# Patient Record
Sex: FEMALE | Race: BLACK OR AFRICAN AMERICAN | NOT HISPANIC OR LATINO | Employment: OTHER | URBAN - METROPOLITAN AREA
[De-identification: names, ages, dates, MRNs, and addresses within clinical notes are randomized per-mention and may not be internally consistent; named-entity substitution may affect disease eponyms.]

---

## 2020-01-01 ENCOUNTER — HOSPITAL ENCOUNTER (EMERGENCY)
Facility: HOSPITAL | Age: 65
Discharge: HOME | End: 2020-06-18
Attending: EMERGENCY MEDICINE | Admitting: EMERGENCY MEDICINE
Payer: MEDICARE

## 2020-01-01 ENCOUNTER — APPOINTMENT (INPATIENT)
Dept: NEUROLOGY | Facility: CLINIC | Age: 65
DRG: 208 | End: 2020-01-01
Payer: MEDICARE

## 2020-01-01 ENCOUNTER — APPOINTMENT (INPATIENT)
Dept: GASTROENTEROLOGY | Facility: HOSPITAL | Age: 65
DRG: 208 | End: 2020-01-01
Payer: MEDICARE

## 2020-01-01 ENCOUNTER — APPOINTMENT (INPATIENT)
Dept: NON INVASIVE DIAGNOSTICS | Facility: HOSPITAL | Age: 65
DRG: 291 | End: 2020-01-01
Payer: MEDICARE

## 2020-01-01 ENCOUNTER — APPOINTMENT (INPATIENT)
Dept: RADIOLOGY | Facility: HOSPITAL | Age: 65
DRG: 208 | End: 2020-01-01
Payer: MEDICARE

## 2020-01-01 ENCOUNTER — APPOINTMENT (EMERGENCY)
Dept: RADIOLOGY | Facility: HOSPITAL | Age: 65
End: 2020-01-01
Payer: MEDICARE

## 2020-01-01 ENCOUNTER — APPOINTMENT (EMERGENCY)
Dept: RADIOLOGY | Facility: HOSPITAL | Age: 65
DRG: 291 | End: 2020-01-01
Payer: MEDICARE

## 2020-01-01 ENCOUNTER — HOSPITAL ENCOUNTER (INPATIENT)
Facility: HOSPITAL | Age: 65
LOS: 1 days | DRG: 291 | End: 2020-06-29
Attending: EMERGENCY MEDICINE | Admitting: ANESTHESIOLOGY
Payer: MEDICARE

## 2020-01-01 ENCOUNTER — HOSPITAL ENCOUNTER (EMERGENCY)
Facility: HOSPITAL | Age: 65
Discharge: HOME | End: 2020-06-17
Attending: EMERGENCY MEDICINE | Admitting: EMERGENCY MEDICINE
Payer: MEDICARE

## 2020-01-01 ENCOUNTER — APPOINTMENT (INPATIENT)
Dept: RADIOLOGY | Facility: HOSPITAL | Age: 65
DRG: 291 | End: 2020-01-01
Payer: MEDICARE

## 2020-01-01 ENCOUNTER — HOSPITAL ENCOUNTER (INPATIENT)
Facility: HOSPITAL | Age: 65
LOS: 4 days | DRG: 208 | End: 2020-07-03
Attending: EMERGENCY MEDICINE | Admitting: EMERGENCY MEDICINE
Payer: MEDICARE

## 2020-01-01 VITALS
DIASTOLIC BLOOD PRESSURE: 55 MMHG | HEART RATE: 92 BPM | SYSTOLIC BLOOD PRESSURE: 99 MMHG | BODY MASS INDEX: 16.84 KG/M2 | HEIGHT: 57 IN | OXYGEN SATURATION: 100 % | WEIGHT: 78.04 LBS | RESPIRATION RATE: 41 BRPM | TEMPERATURE: 97.2 F

## 2020-01-01 VITALS
OXYGEN SATURATION: 95 % | RESPIRATION RATE: 39 BRPM | WEIGHT: 95.24 LBS | DIASTOLIC BLOOD PRESSURE: 52 MMHG | HEART RATE: 78 BPM | SYSTOLIC BLOOD PRESSURE: 114 MMHG | TEMPERATURE: 97.5 F | BODY MASS INDEX: 20.61 KG/M2

## 2020-01-01 VITALS
DIASTOLIC BLOOD PRESSURE: 63 MMHG | TEMPERATURE: 98 F | HEART RATE: 92 BPM | RESPIRATION RATE: 20 BRPM | WEIGHT: 78 LBS | OXYGEN SATURATION: 94 % | SYSTOLIC BLOOD PRESSURE: 105 MMHG

## 2020-01-01 VITALS
HEIGHT: 60 IN | WEIGHT: 78 LBS | BODY MASS INDEX: 15.31 KG/M2 | HEART RATE: 92 BPM | SYSTOLIC BLOOD PRESSURE: 126 MMHG | RESPIRATION RATE: 20 BRPM | OXYGEN SATURATION: 95 % | DIASTOLIC BLOOD PRESSURE: 72 MMHG | TEMPERATURE: 98.4 F

## 2020-01-01 DIAGNOSIS — M79.89 LEFT LEG SWELLING: Primary | ICD-10-CM

## 2020-01-01 DIAGNOSIS — Z99.2 END STAGE RENAL DISEASE ON DIALYSIS (HCC): ICD-10-CM

## 2020-01-01 DIAGNOSIS — R56.9 WITNESSED SEIZURE-LIKE ACTIVITY (HCC): ICD-10-CM

## 2020-01-01 DIAGNOSIS — N18.6 END STAGE RENAL DISEASE ON DIALYSIS (HCC): ICD-10-CM

## 2020-01-01 DIAGNOSIS — N18.6 ESRD (END STAGE RENAL DISEASE) (HCC): Primary | ICD-10-CM

## 2020-01-01 DIAGNOSIS — S31.809A WOUND OF BUTTOCK: ICD-10-CM

## 2020-01-01 DIAGNOSIS — T17.500A MUCUS PLUGGING OF BRONCHI: ICD-10-CM

## 2020-01-01 DIAGNOSIS — I46.9 CARDIOPULMONARY ARREST (HCC): Primary | ICD-10-CM

## 2020-01-01 LAB
ABO GROUP BLD BPU: NORMAL
ABO GROUP BLD: NORMAL
ABO GROUP BLD: NORMAL
ALBUMIN SERPL BCP-MCNC: 2.6 G/DL (ref 3.5–5)
ALBUMIN SERPL BCP-MCNC: 2.8 G/DL (ref 3.5–5)
ALBUMIN SERPL BCP-MCNC: 2.9 G/DL (ref 3.5–5)
ALBUMIN SERPL BCP-MCNC: 2.9 G/DL (ref 3.5–5)
ALBUMIN SERPL BCP-MCNC: 4.2 G/DL (ref 3.5–5)
ALBUMIN SERPL BCP-MCNC: 4.2 G/DL (ref 3.5–5)
ALP SERPL-CCNC: 197 U/L (ref 46–116)
ALP SERPL-CCNC: 198 U/L (ref 46–116)
ALP SERPL-CCNC: 198 U/L (ref 46–116)
ALP SERPL-CCNC: 199 U/L (ref 46–116)
ALP SERPL-CCNC: 294 U/L (ref 46–116)
ALP SERPL-CCNC: 303 U/L (ref 46–116)
ALT SERPL W P-5'-P-CCNC: 19 U/L (ref 12–78)
ALT SERPL W P-5'-P-CCNC: 22 U/L (ref 12–78)
ALT SERPL W P-5'-P-CCNC: 42 U/L (ref 12–78)
ALT SERPL W P-5'-P-CCNC: 52 U/L (ref 12–78)
ALT SERPL W P-5'-P-CCNC: 56 U/L (ref 12–78)
ALT SERPL W P-5'-P-CCNC: 75 U/L (ref 12–78)
ANION GAP SERPL CALCULATED.3IONS-SCNC: 10 MMOL/L (ref 4–13)
ANION GAP SERPL CALCULATED.3IONS-SCNC: 10 MMOL/L (ref 4–13)
ANION GAP SERPL CALCULATED.3IONS-SCNC: 11 MMOL/L (ref 4–13)
ANION GAP SERPL CALCULATED.3IONS-SCNC: 11 MMOL/L (ref 4–13)
ANION GAP SERPL CALCULATED.3IONS-SCNC: 12 MMOL/L (ref 4–13)
ANION GAP SERPL CALCULATED.3IONS-SCNC: 13 MMOL/L (ref 4–13)
ANION GAP SERPL CALCULATED.3IONS-SCNC: 13 MMOL/L (ref 4–13)
ANION GAP SERPL CALCULATED.3IONS-SCNC: 14 MMOL/L (ref 4–13)
ANION GAP SERPL CALCULATED.3IONS-SCNC: 16 MMOL/L (ref 4–13)
ANION GAP SERPL CALCULATED.3IONS-SCNC: 17 MMOL/L (ref 4–13)
ANION GAP SERPL CALCULATED.3IONS-SCNC: 5 MMOL/L (ref 4–13)
ANION GAP SERPL CALCULATED.3IONS-SCNC: 7 MMOL/L (ref 4–13)
ANION GAP SERPL CALCULATED.3IONS-SCNC: 7 MMOL/L (ref 4–13)
ANION GAP SERPL CALCULATED.3IONS-SCNC: 8 MMOL/L (ref 4–13)
ANION GAP SERPL CALCULATED.3IONS-SCNC: 8 MMOL/L (ref 4–13)
ANION GAP SERPL CALCULATED.3IONS-SCNC: 9 MMOL/L (ref 4–13)
ANION GAP SERPL CALCULATED.3IONS-SCNC: 9 MMOL/L (ref 4–13)
APTT PPP: 42 SECONDS (ref 23–37)
APTT PPP: 48 SECONDS (ref 23–37)
APTT PPP: 49 SECONDS (ref 23–37)
APTT PPP: 62 SECONDS (ref 23–37)
APTT PPP: 62 SECONDS (ref 23–37)
APTT PPP: 66 SECONDS (ref 23–37)
APTT PPP: 68 SECONDS (ref 23–37)
ARTERIAL PATENCY WRIST A: YES
AST SERPL W P-5'-P-CCNC: 105 U/L (ref 5–45)
AST SERPL W P-5'-P-CCNC: 114 U/L (ref 5–45)
AST SERPL W P-5'-P-CCNC: 16 U/L (ref 5–45)
AST SERPL W P-5'-P-CCNC: 23 U/L (ref 5–45)
AST SERPL W P-5'-P-CCNC: 57 U/L (ref 5–45)
AST SERPL W P-5'-P-CCNC: 91 U/L (ref 5–45)
ATRIAL RATE: 120 BPM
ATRIAL RATE: 83 BPM
ATRIAL RATE: 90 BPM
BACTERIA SPT RESP CULT: ABNORMAL
BACTERIA SPT RESP CULT: ABNORMAL
BASE EXCESS BLDA CALC-SCNC: -0.1 MMOL/L
BASE EXCESS BLDA CALC-SCNC: -1.3 MMOL/L
BASE EXCESS BLDA CALC-SCNC: -1.8 MMOL/L
BASE EXCESS BLDA CALC-SCNC: -10.9 MMOL/L
BASE EXCESS BLDA CALC-SCNC: -2 MMOL/L (ref -2–3)
BASE EXCESS BLDA CALC-SCNC: -3.4 MMOL/L
BASE EXCESS BLDA CALC-SCNC: 0 MMOL/L (ref -2–3)
BASE EXCESS BLDA CALC-SCNC: 1.5 MMOL/L
BASE EXCESS BLDA CALC-SCNC: 1.9 MMOL/L
BASE EXCESS BLDA CALC-SCNC: 10.3 MMOL/L
BASE EXCESS BLDA CALC-SCNC: 2 MMOL/L
BASE EXCESS BLDA CALC-SCNC: 8 MMOL/L (ref -2–3)
BASOPHILS # BLD AUTO: 0.04 THOUSANDS/ΜL (ref 0–0.1)
BASOPHILS # BLD MANUAL: 0 THOUSAND/UL (ref 0–0.1)
BASOPHILS NFR BLD AUTO: 1 % (ref 0–1)
BASOPHILS NFR MAR MANUAL: 0 % (ref 0–1)
BILIRUB DIRECT SERPL-MCNC: 0.19 MG/DL (ref 0–0.2)
BILIRUB DIRECT SERPL-MCNC: 0.23 MG/DL (ref 0–0.2)
BILIRUB DIRECT SERPL-MCNC: 0.31 MG/DL (ref 0–0.2)
BILIRUB SERPL-MCNC: 0.3 MG/DL (ref 0.2–1)
BILIRUB SERPL-MCNC: 0.3 MG/DL (ref 0.2–1)
BILIRUB SERPL-MCNC: 0.5 MG/DL (ref 0.2–1)
BILIRUB SERPL-MCNC: 0.58 MG/DL (ref 0.2–1)
BILIRUB SERPL-MCNC: 0.81 MG/DL (ref 0.2–1)
BILIRUB SERPL-MCNC: 0.84 MG/DL (ref 0.2–1)
BLD GP AB SCN SERPL QL: NEGATIVE
BLD SMEAR INTERP: NORMAL
BODY TEMPERATURE: 91.4 DEGREES FEHRENHEIT
BODY TEMPERATURE: 92.6 DEGREES FEHRENHEIT
BODY TEMPERATURE: 96.3 DEGREES FEHRENHEIT
BODY TEMPERATURE: 96.7 DEGREES FEHRENHEIT
BODY TEMPERATURE: 97.3 DEGREES FEHRENHEIT
BODY TEMPERATURE: 97.9 DEGREES FEHRENHEIT
BODY TEMPERATURE: 98.7 DEGREES FEHRENHEIT
BODY TEMPERATURE: 98.7 DEGREES FEHRENHEIT
BPU ID: NORMAL
BUN SERPL-MCNC: 22 MG/DL (ref 5–25)
BUN SERPL-MCNC: 27 MG/DL (ref 5–25)
BUN SERPL-MCNC: 32 MG/DL (ref 5–25)
BUN SERPL-MCNC: 33 MG/DL (ref 5–25)
BUN SERPL-MCNC: 34 MG/DL (ref 5–25)
BUN SERPL-MCNC: 36 MG/DL (ref 5–25)
BUN SERPL-MCNC: 38 MG/DL (ref 5–25)
BUN SERPL-MCNC: 44 MG/DL (ref 5–25)
BUN SERPL-MCNC: 46 MG/DL (ref 5–25)
BUN SERPL-MCNC: 55 MG/DL (ref 5–25)
BUN SERPL-MCNC: 66 MG/DL (ref 5–25)
BUN SERPL-MCNC: 84 MG/DL (ref 5–25)
BUN SERPL-MCNC: 90 MG/DL (ref 5–25)
BUN SERPL-MCNC: 91 MG/DL (ref 5–25)
BUN SERPL-MCNC: 97 MG/DL (ref 5–25)
CA-I BLD-SCNC: 1.1 MMOL/L (ref 1.12–1.32)
CA-I BLD-SCNC: 1.11 MMOL/L (ref 1.12–1.32)
CA-I BLD-SCNC: 1.14 MMOL/L (ref 1.12–1.32)
CA-I BLD-SCNC: 1.15 MMOL/L (ref 1.12–1.32)
CA-I BLD-SCNC: 1.16 MMOL/L (ref 1.12–1.32)
CA-I BLD-SCNC: 1.16 MMOL/L (ref 1.12–1.32)
CA-I BLD-SCNC: 1.17 MMOL/L (ref 1.12–1.32)
CA-I BLD-SCNC: 1.18 MMOL/L (ref 1.12–1.32)
CA-I BLD-SCNC: 1.2 MMOL/L (ref 1.12–1.32)
CA-I BLD-SCNC: 1.21 MMOL/L (ref 1.12–1.32)
CA-I BLD-SCNC: 1.23 MMOL/L (ref 1.12–1.32)
CA-I BLD-SCNC: 1.24 MMOL/L (ref 1.12–1.32)
CA-I BLD-SCNC: 1.25 MMOL/L (ref 1.12–1.32)
CA-I BLD-SCNC: 1.39 MMOL/L (ref 1.12–1.32)
CALCIUM SERPL-MCNC: 10.1 MG/DL (ref 8.3–10.1)
CALCIUM SERPL-MCNC: 10.2 MG/DL (ref 8.3–10.1)
CALCIUM SERPL-MCNC: 11.2 MG/DL (ref 8.3–10.1)
CALCIUM SERPL-MCNC: 14.3 MG/DL (ref 8.3–10.1)
CALCIUM SERPL-MCNC: 8.4 MG/DL (ref 8.3–10.1)
CALCIUM SERPL-MCNC: 8.7 MG/DL (ref 8.3–10.1)
CALCIUM SERPL-MCNC: 8.8 MG/DL (ref 8.3–10.1)
CALCIUM SERPL-MCNC: 8.9 MG/DL (ref 8.3–10.1)
CALCIUM SERPL-MCNC: 9 MG/DL (ref 8.3–10.1)
CALCIUM SERPL-MCNC: 9.2 MG/DL (ref 8.3–10.1)
CALCIUM SERPL-MCNC: 9.4 MG/DL (ref 8.3–10.1)
CALCIUM SERPL-MCNC: 9.6 MG/DL (ref 8.3–10.1)
CALCIUM SERPL-MCNC: 9.6 MG/DL (ref 8.3–10.1)
CALCIUM SERPL-MCNC: 9.7 MG/DL (ref 8.3–10.1)
CALCIUM SERPL-MCNC: 9.9 MG/DL (ref 8.3–10.1)
CHLORIDE SERPL-SCNC: 102 MMOL/L (ref 100–108)
CHLORIDE SERPL-SCNC: 102 MMOL/L (ref 100–108)
CHLORIDE SERPL-SCNC: 104 MMOL/L (ref 100–108)
CHLORIDE SERPL-SCNC: 105 MMOL/L (ref 100–108)
CHLORIDE SERPL-SCNC: 105 MMOL/L (ref 100–108)
CHLORIDE SERPL-SCNC: 106 MMOL/L (ref 100–108)
CHLORIDE SERPL-SCNC: 107 MMOL/L (ref 100–108)
CHLORIDE SERPL-SCNC: 107 MMOL/L (ref 100–108)
CHLORIDE SERPL-SCNC: 108 MMOL/L (ref 100–108)
CHLORIDE SERPL-SCNC: 109 MMOL/L (ref 100–108)
CHLORIDE SERPL-SCNC: 112 MMOL/L (ref 100–108)
CO2 SERPL-SCNC: 18 MMOL/L (ref 21–32)
CO2 SERPL-SCNC: 21 MMOL/L (ref 21–32)
CO2 SERPL-SCNC: 23 MMOL/L (ref 21–32)
CO2 SERPL-SCNC: 24 MMOL/L (ref 21–32)
CO2 SERPL-SCNC: 25 MMOL/L (ref 21–32)
CO2 SERPL-SCNC: 26 MMOL/L (ref 21–32)
CO2 SERPL-SCNC: 28 MMOL/L (ref 21–32)
CO2 SERPL-SCNC: 29 MMOL/L (ref 21–32)
CO2 SERPL-SCNC: 30 MMOL/L (ref 21–32)
CO2 SERPL-SCNC: 32 MMOL/L (ref 21–32)
CREAT SERPL-MCNC: 1.98 MG/DL (ref 0.6–1.3)
CREAT SERPL-MCNC: 10.41 MG/DL (ref 0.6–1.3)
CREAT SERPL-MCNC: 10.79 MG/DL (ref 0.6–1.3)
CREAT SERPL-MCNC: 2.27 MG/DL (ref 0.6–1.3)
CREAT SERPL-MCNC: 2.31 MG/DL (ref 0.6–1.3)
CREAT SERPL-MCNC: 2.54 MG/DL (ref 0.6–1.3)
CREAT SERPL-MCNC: 2.88 MG/DL (ref 0.6–1.3)
CREAT SERPL-MCNC: 3.07 MG/DL (ref 0.6–1.3)
CREAT SERPL-MCNC: 3.08 MG/DL (ref 0.6–1.3)
CREAT SERPL-MCNC: 3.55 MG/DL (ref 0.6–1.3)
CREAT SERPL-MCNC: 4.31 MG/DL (ref 0.6–1.3)
CREAT SERPL-MCNC: 5.36 MG/DL (ref 0.6–1.3)
CREAT SERPL-MCNC: 6.53 MG/DL (ref 0.6–1.3)
CREAT SERPL-MCNC: 6.64 MG/DL (ref 0.6–1.3)
CREAT SERPL-MCNC: 7.77 MG/DL (ref 0.6–1.3)
CREAT SERPL-MCNC: 8.75 MG/DL (ref 0.6–1.3)
CREAT SERPL-MCNC: 9.96 MG/DL (ref 0.6–1.3)
CROSSMATCH: NORMAL
EOSINOPHIL # BLD AUTO: 0.33 THOUSAND/ΜL (ref 0–0.61)
EOSINOPHIL # BLD MANUAL: 0.21 THOUSAND/UL (ref 0–0.4)
EOSINOPHIL NFR BLD AUTO: 7 % (ref 0–6)
EOSINOPHIL NFR BLD MANUAL: 3 % (ref 0–6)
ERYTHROCYTE [DISTWIDTH] IN BLOOD BY AUTOMATED COUNT: 16.3 % (ref 11.6–15.1)
ERYTHROCYTE [DISTWIDTH] IN BLOOD BY AUTOMATED COUNT: 16.6 % (ref 11.6–15.1)
ERYTHROCYTE [DISTWIDTH] IN BLOOD BY AUTOMATED COUNT: 16.6 % (ref 11.6–15.1)
ERYTHROCYTE [DISTWIDTH] IN BLOOD BY AUTOMATED COUNT: 17.2 % (ref 11.6–15.1)
ERYTHROCYTE [DISTWIDTH] IN BLOOD BY AUTOMATED COUNT: 17.4 % (ref 11.6–15.1)
ERYTHROCYTE [DISTWIDTH] IN BLOOD BY AUTOMATED COUNT: 17.6 % (ref 11.6–15.1)
ERYTHROCYTE [DISTWIDTH] IN BLOOD BY AUTOMATED COUNT: 17.7 % (ref 11.6–15.1)
ERYTHROCYTE [DISTWIDTH] IN BLOOD BY AUTOMATED COUNT: 17.8 % (ref 11.6–15.1)
EST. AVERAGE GLUCOSE BLD GHB EST-MCNC: 103 MG/DL
FDP BLD QL AGGL: >20 <40
FIBRINOGEN PPP-MCNC: 247 MG/DL (ref 227–495)
FIO2 GAS DIL.REBREATH: 100 L
FIO2 GAS DIL.REBREATH: 40 L
FIO2 GAS DIL.REBREATH: 70 L
GFR SERPL CREATININE-BSD FRML MDRD: 12 ML/MIN/1.73SQ M
GFR SERPL CREATININE-BSD FRML MDRD: 15 ML/MIN/1.73SQ M
GFR SERPL CREATININE-BSD FRML MDRD: 17 ML/MIN/1.73SQ M
GFR SERPL CREATININE-BSD FRML MDRD: 17 ML/MIN/1.73SQ M
GFR SERPL CREATININE-BSD FRML MDRD: 19 ML/MIN/1.73SQ M
GFR SERPL CREATININE-BSD FRML MDRD: 22 ML/MIN/1.73SQ M
GFR SERPL CREATININE-BSD FRML MDRD: 25 ML/MIN/1.73SQ M
GFR SERPL CREATININE-BSD FRML MDRD: 25 ML/MIN/1.73SQ M
GFR SERPL CREATININE-BSD FRML MDRD: 30 ML/MIN/1.73SQ M
GFR SERPL CREATININE-BSD FRML MDRD: 4 ML/MIN/1.73SQ M
GFR SERPL CREATININE-BSD FRML MDRD: 5 ML/MIN/1.73SQ M
GFR SERPL CREATININE-BSD FRML MDRD: 6 ML/MIN/1.73SQ M
GFR SERPL CREATININE-BSD FRML MDRD: 7 ML/MIN/1.73SQ M
GFR SERPL CREATININE-BSD FRML MDRD: 7 ML/MIN/1.73SQ M
GFR SERPL CREATININE-BSD FRML MDRD: 9 ML/MIN/1.73SQ M
GLUCOSE SERPL-MCNC: 100 MG/DL (ref 65–140)
GLUCOSE SERPL-MCNC: 107 MG/DL (ref 65–140)
GLUCOSE SERPL-MCNC: 107 MG/DL (ref 65–140)
GLUCOSE SERPL-MCNC: 114 MG/DL (ref 65–140)
GLUCOSE SERPL-MCNC: 115 MG/DL (ref 65–140)
GLUCOSE SERPL-MCNC: 119 MG/DL (ref 65–140)
GLUCOSE SERPL-MCNC: 120 MG/DL (ref 65–140)
GLUCOSE SERPL-MCNC: 123 MG/DL (ref 65–140)
GLUCOSE SERPL-MCNC: 125 MG/DL (ref 65–140)
GLUCOSE SERPL-MCNC: 126 MG/DL (ref 65–140)
GLUCOSE SERPL-MCNC: 129 MG/DL (ref 65–140)
GLUCOSE SERPL-MCNC: 131 MG/DL (ref 65–140)
GLUCOSE SERPL-MCNC: 138 MG/DL (ref 65–140)
GLUCOSE SERPL-MCNC: 139 MG/DL (ref 65–140)
GLUCOSE SERPL-MCNC: 140 MG/DL (ref 65–140)
GLUCOSE SERPL-MCNC: 145 MG/DL (ref 65–140)
GLUCOSE SERPL-MCNC: 151 MG/DL (ref 65–140)
GLUCOSE SERPL-MCNC: 159 MG/DL (ref 65–140)
GLUCOSE SERPL-MCNC: 162 MG/DL (ref 65–140)
GLUCOSE SERPL-MCNC: 165 MG/DL (ref 65–140)
GLUCOSE SERPL-MCNC: 174 MG/DL (ref 65–140)
GLUCOSE SERPL-MCNC: 198 MG/DL (ref 65–140)
GLUCOSE SERPL-MCNC: 21 MG/DL (ref 65–140)
GLUCOSE SERPL-MCNC: 218 MG/DL (ref 65–140)
GLUCOSE SERPL-MCNC: 220 MG/DL (ref 65–140)
GLUCOSE SERPL-MCNC: 229 MG/DL (ref 65–140)
GLUCOSE SERPL-MCNC: 233 MG/DL (ref 65–140)
GLUCOSE SERPL-MCNC: 244 MG/DL (ref 65–140)
GLUCOSE SERPL-MCNC: 248 MG/DL (ref 65–140)
GLUCOSE SERPL-MCNC: 285 MG/DL (ref 65–140)
GLUCOSE SERPL-MCNC: 287 MG/DL (ref 65–140)
GLUCOSE SERPL-MCNC: 318 MG/DL (ref 65–140)
GLUCOSE SERPL-MCNC: 324 MG/DL (ref 65–140)
GLUCOSE SERPL-MCNC: 360 MG/DL (ref 65–140)
GLUCOSE SERPL-MCNC: 396 MG/DL (ref 65–140)
GLUCOSE SERPL-MCNC: 456 MG/DL (ref 65–140)
GLUCOSE SERPL-MCNC: 76 MG/DL (ref 65–140)
GLUCOSE SERPL-MCNC: 79 MG/DL (ref 65–140)
GLUCOSE SERPL-MCNC: 82 MG/DL (ref 65–140)
GLUCOSE SERPL-MCNC: 85 MG/DL (ref 65–140)
GLUCOSE SERPL-MCNC: 92 MG/DL (ref 65–140)
GLUCOSE SERPL-MCNC: <20 MG/DL (ref 65–140)
GLUCOSE SERPL-MCNC: >600 MG/DL (ref 65–140)
GRAM STN SPEC: ABNORMAL
HAPTOGLOB SERPL-MCNC: <10 MG/DL (ref 37–355)
HBA1C MFR BLD: 5.2 %
HCO3 BLDA-SCNC: 16.2 MMOL/L (ref 22–28)
HCO3 BLDA-SCNC: 20.1 MMOL/L (ref 22–28)
HCO3 BLDA-SCNC: 22.1 MMOL/L (ref 22–28)
HCO3 BLDA-SCNC: 22.2 MMOL/L (ref 22–28)
HCO3 BLDA-SCNC: 22.9 MMOL/L (ref 22–28)
HCO3 BLDA-SCNC: 23.9 MMOL/L (ref 22–28)
HCO3 BLDA-SCNC: 24.5 MMOL/L (ref 22–28)
HCO3 BLDA-SCNC: 24.9 MMOL/L (ref 22–28)
HCO3 BLDA-SCNC: 25.2 MMOL/L (ref 22–28)
HCO3 BLDA-SCNC: 25.7 MMOL/L (ref 22–28)
HCO3 BLDA-SCNC: 33 MMOL/L (ref 22–28)
HCO3 BLDA-SCNC: 33.7 MMOL/L (ref 24–30)
HCT VFR BLD AUTO: 24.3 % (ref 34.8–46.1)
HCT VFR BLD AUTO: 27.2 % (ref 34.8–46.1)
HCT VFR BLD AUTO: 28.3 % (ref 34.8–46.1)
HCT VFR BLD AUTO: 28.4 % (ref 34.8–46.1)
HCT VFR BLD AUTO: 29.1 % (ref 34.8–46.1)
HCT VFR BLD AUTO: 33.2 % (ref 34.8–46.1)
HCT VFR BLD AUTO: 34.8 % (ref 34.8–46.1)
HCT VFR BLD AUTO: 36.1 % (ref 34.8–46.1)
HCT VFR BLD AUTO: 38.8 % (ref 34.8–46.1)
HCT VFR BLD CALC: 28 % (ref 34.8–46.1)
HCT VFR BLD CALC: 32 % (ref 34.8–46.1)
HCT VFR BLD CALC: 35 % (ref 34.8–46.1)
HGB BLD-MCNC: 10.3 G/DL (ref 11.5–15.4)
HGB BLD-MCNC: 10.4 G/DL (ref 11.5–15.4)
HGB BLD-MCNC: 11.6 G/DL (ref 11.5–15.4)
HGB BLD-MCNC: 11.9 G/DL (ref 11.5–15.4)
HGB BLD-MCNC: 7.5 G/DL (ref 11.5–15.4)
HGB BLD-MCNC: 8.4 G/DL (ref 11.5–15.4)
HGB BLD-MCNC: 9 G/DL (ref 11.5–15.4)
HGB BLD-MCNC: 9.1 G/DL (ref 11.5–15.4)
HGB BLD-MCNC: 9.1 G/DL (ref 11.5–15.4)
HGB BLD-MCNC: 9.2 G/DL (ref 11.5–15.4)
HGB BLDA-MCNC: 10.9 G/DL (ref 11.5–15.4)
HGB BLDA-MCNC: 11.9 G/DL (ref 11.5–15.4)
HGB BLDA-MCNC: 9.5 G/DL (ref 11.5–15.4)
HOROWITZ INDEX BLDA+IHG-RTO: 100 MM[HG]
HOROWITZ INDEX BLDA+IHG-RTO: 40 MM[HG]
HOROWITZ INDEX BLDA+IHG-RTO: 60 MM[HG]
HOROWITZ INDEX BLDA+IHG-RTO: 80 MM[HG]
IMM GRANULOCYTES # BLD AUTO: 0.01 THOUSAND/UL (ref 0–0.2)
IMM GRANULOCYTES NFR BLD AUTO: 0 % (ref 0–2)
INR PPP: 1.44 (ref 0.84–1.19)
LACTATE SERPL-SCNC: 1.6 MMOL/L (ref 0.5–2)
LACTATE SERPL-SCNC: 12.4 MMOL/L (ref 0.5–2)
LACTATE SERPL-SCNC: 5.9 MMOL/L (ref 0.5–2)
LDH SERPL-CCNC: 630 U/L (ref 81–234)
LYMPHOCYTES # BLD AUTO: 1.03 THOUSANDS/ΜL (ref 0.6–4.47)
LYMPHOCYTES # BLD AUTO: 3.3 THOUSAND/UL (ref 0.6–4.47)
LYMPHOCYTES # BLD AUTO: 47 % (ref 14–44)
LYMPHOCYTES NFR BLD AUTO: 22 % (ref 14–44)
MAGNESIUM SERPL-MCNC: 2 MG/DL (ref 1.6–2.6)
MAGNESIUM SERPL-MCNC: 2.1 MG/DL (ref 1.6–2.6)
MAGNESIUM SERPL-MCNC: 2.2 MG/DL (ref 1.6–2.6)
MAGNESIUM SERPL-MCNC: 2.3 MG/DL (ref 1.6–2.6)
MAGNESIUM SERPL-MCNC: 2.4 MG/DL (ref 1.6–2.6)
MAGNESIUM SERPL-MCNC: 2.4 MG/DL (ref 1.6–2.6)
MAGNESIUM SERPL-MCNC: 2.5 MG/DL (ref 1.6–2.6)
MAGNESIUM SERPL-MCNC: 2.9 MG/DL (ref 1.6–2.6)
MCH RBC QN AUTO: 27.7 PG (ref 26.8–34.3)
MCH RBC QN AUTO: 27.9 PG (ref 26.8–34.3)
MCH RBC QN AUTO: 28.1 PG (ref 26.8–34.3)
MCH RBC QN AUTO: 28.2 PG (ref 26.8–34.3)
MCH RBC QN AUTO: 28.2 PG (ref 26.8–34.3)
MCH RBC QN AUTO: 28.4 PG (ref 26.8–34.3)
MCH RBC QN AUTO: 28.9 PG (ref 26.8–34.3)
MCH RBC QN AUTO: 29 PG (ref 26.8–34.3)
MCHC RBC AUTO-ENTMCNC: 29.6 G/DL (ref 31.4–37.4)
MCHC RBC AUTO-ENTMCNC: 30.7 G/DL (ref 31.4–37.4)
MCHC RBC AUTO-ENTMCNC: 30.9 G/DL (ref 31.4–37.4)
MCHC RBC AUTO-ENTMCNC: 30.9 G/DL (ref 31.4–37.4)
MCHC RBC AUTO-ENTMCNC: 31.3 G/DL (ref 31.4–37.4)
MCHC RBC AUTO-ENTMCNC: 31.3 G/DL (ref 31.4–37.4)
MCHC RBC AUTO-ENTMCNC: 31.8 G/DL (ref 31.4–37.4)
MCHC RBC AUTO-ENTMCNC: 32.4 G/DL (ref 31.4–37.4)
MCV RBC AUTO: 86 FL (ref 82–98)
MCV RBC AUTO: 89 FL (ref 82–98)
MCV RBC AUTO: 90 FL (ref 82–98)
MCV RBC AUTO: 92 FL (ref 82–98)
MCV RBC AUTO: 94 FL (ref 82–98)
MCV RBC AUTO: 98 FL (ref 82–98)
METAMYELOCYTES NFR BLD MANUAL: 1 % (ref 0–1)
MONOCYTES # BLD AUTO: 0.28 THOUSAND/UL (ref 0–1.22)
MONOCYTES # BLD AUTO: 0.65 THOUSAND/ΜL (ref 0.17–1.22)
MONOCYTES NFR BLD AUTO: 14 % (ref 4–12)
MONOCYTES NFR BLD: 4 % (ref 4–12)
MRSA NOSE QL CULT: NORMAL
NEUTROPHILS # BLD AUTO: 2.53 THOUSANDS/ΜL (ref 1.85–7.62)
NEUTROPHILS # BLD MANUAL: 3.16 THOUSAND/UL (ref 1.85–7.62)
NEUTS SEG NFR BLD AUTO: 45 % (ref 43–75)
NEUTS SEG NFR BLD AUTO: 56 % (ref 43–75)
NRBC BLD AUTO-RTO: 0 /100 WBCS
NRBC BLD AUTO-RTO: 1 /100 WBC (ref 0–2)
NRBC BLD AUTO-RTO: 1 /100 WBCS
NT-PROBNP SERPL-MCNC: ABNORMAL PG/ML
O2 CT BLDA-SCNC: 12.4 ML/DL (ref 16–23)
O2 CT BLDA-SCNC: 12.8 ML/DL (ref 16–23)
O2 CT BLDA-SCNC: 13.3 ML/DL (ref 95–100)
O2 CT BLDA-SCNC: 14.2 ML/DL (ref 16–23)
O2 CT BLDA-SCNC: 14.6 ML/DL (ref 16–23)
O2 CT BLDA-SCNC: 14.9 ML/DL (ref 16–23)
O2 CT BLDA-SCNC: 15.4 ML/DL (ref 16–23)
O2 CT BLDA-SCNC: 15.7 ML/DL (ref 16–23)
O2 CT BLDA-SCNC: 17.3 ML/DL (ref 16–23)
OVALOCYTES BLD QL SMEAR: PRESENT
OXYHGB MFR BLDA: 91.1 % (ref 94–97)
OXYHGB MFR BLDA: 95.3 % (ref 94–97)
OXYHGB MFR BLDA: 96.5 % (ref 94–97)
OXYHGB MFR BLDA: 96.8 % (ref 94–97)
OXYHGB MFR BLDA: 97.6 % (ref 94–97)
OXYHGB MFR BLDA: 97.6 % (ref 94–97)
OXYHGB MFR BLDA: 97.9 % (ref 94–97)
OXYHGB MFR BLDA: 98.6 % (ref 94–97)
OXYHGB MFR BLDA: 99.3 % (ref 94–97)
P AXIS: 54 DEGREES
P AXIS: 68 DEGREES
P AXIS: 71 DEGREES
P AXIS: 73 DEGREES
PCO2 BLD: 23 MMOL/L (ref 21–32)
PCO2 BLD: 27 MMOL/L (ref 21–32)
PCO2 BLD: 35 MMOL/L (ref 21–32)
PCO2 BLD: 35.8 MM HG (ref 36–44)
PCO2 BLD: 46.3 MM HG (ref 36–44)
PCO2 BLD: 53.8 MM HG (ref 42–50)
PCO2 BLD: 69 MM HG
PCO2 BLD: 96 MM HG
PCO2 BLDA: 30.4 MM HG (ref 36–44)
PCO2 BLDA: 30.4 MM HG (ref 36–44)
PCO2 BLDA: 33.4 MM HG (ref 36–44)
PCO2 BLDA: 34 MM HG (ref 36–44)
PCO2 BLDA: 34 MM HG (ref 36–44)
PCO2 BLDA: 36 MM HG (ref 36–44)
PCO2 BLDA: 36.9 MM HG (ref 36–44)
PCO2 BLDA: 39.3 MM HG
PCO2 BLDA: 39.9 MM HG (ref 36–44)
PCO2 BLDA: 40.6 MM HG (ref 36–44)
PCO2 BLDA: 49.8 MM HG
PCO2 TEMP ADJ BLDA: 31.6 MM HG (ref 36–44)
PCO2 TEMP ADJ BLDA: 32.4 MM HG (ref 36–44)
PCO2 TEMP ADJ BLDA: 33.5 MM HG (ref 36–44)
PCO2 TEMP ADJ BLDA: 34.1 MM HG (ref 36–44)
PCO2 TEMP ADJ BLDA: 35.1 MM HG (ref 36–44)
PCO2 TEMP ADJ BLDA: 35.8 MM HG (ref 36–44)
PEEP RESPIRATORY: 5 CM[H2O]
PEEP RESPIRATORY: 5 CM[H2O]
PEEP RESPIRATORY: 8 CM[H2O]
PH BLD: 7.26 [PH] (ref 7.35–7.45)
PH BLD: 7.35 [PH] (ref 7.35–7.45)
PH BLD: 7.4 [PH] (ref 7.35–7.45)
PH BLD: 7.41 [PH]
PH BLD: 7.41 [PH] (ref 7.3–7.4)
PH BLD: 7.43 [PH]
PH BLD: 7.43 [PH] (ref 7.35–7.45)
PH BLD: 7.46 [PH] (ref 7.35–7.45)
PH BLD: 7.5 [PH] (ref 7.35–7.45)
PH BLD: 7.51 [PH] (ref 7.35–7.45)
PH BLD: 7.58 [PH] (ref 7.35–7.45)
PH BLDA: 7.22 [PH] (ref 7.35–7.45)
PH BLDA: 7.41 [PH] (ref 7.35–7.45)
PH BLDA: 7.42 [PH] (ref 7.35–7.45)
PH BLDA: 7.43 [PH] (ref 7.35–7.45)
PH BLDA: 7.44 [PH] (ref 7.35–7.45)
PH BLDA: 7.49 [PH] (ref 7.35–7.45)
PH BLDA: 7.49 [PH] (ref 7.35–7.45)
PH BLDA: 7.51 [PH] (ref 7.35–7.45)
PH BLDA: 7.57 [PH] (ref 7.35–7.45)
PHOSPHATE SERPL-MCNC: 2.8 MG/DL (ref 2.3–4.1)
PHOSPHATE SERPL-MCNC: 2.9 MG/DL (ref 2.3–4.1)
PHOSPHATE SERPL-MCNC: 3.3 MG/DL (ref 2.3–4.1)
PHOSPHATE SERPL-MCNC: 3.4 MG/DL (ref 2.3–4.1)
PHOSPHATE SERPL-MCNC: 3.6 MG/DL (ref 2.3–4.1)
PHOSPHATE SERPL-MCNC: 3.7 MG/DL (ref 2.3–4.1)
PHOSPHATE SERPL-MCNC: 3.7 MG/DL (ref 2.3–4.1)
PHOSPHATE SERPL-MCNC: 3.9 MG/DL (ref 2.3–4.1)
PHOSPHATE SERPL-MCNC: 4.1 MG/DL (ref 2.3–4.1)
PHOSPHATE SERPL-MCNC: 4.3 MG/DL (ref 2.3–4.1)
PHOSPHATE SERPL-MCNC: 4.5 MG/DL (ref 2.3–4.1)
PHOSPHATE SERPL-MCNC: 5 MG/DL (ref 2.3–4.1)
PHOSPHATE SERPL-MCNC: 7.8 MG/DL (ref 2.3–4.1)
PHOSPHATE SERPL-MCNC: 8.9 MG/DL (ref 2.3–4.1)
PLATELET # BLD AUTO: 10 THOUSANDS/UL (ref 149–390)
PLATELET # BLD AUTO: 14 THOUSANDS/UL (ref 149–390)
PLATELET # BLD AUTO: 146 THOUSANDS/UL (ref 149–390)
PLATELET # BLD AUTO: 174 THOUSANDS/UL (ref 149–390)
PLATELET # BLD AUTO: 208 THOUSANDS/UL (ref 149–390)
PLATELET # BLD AUTO: 26 THOUSANDS/UL (ref 149–390)
PLATELET # BLD AUTO: 31 THOUSANDS/UL (ref 149–390)
PLATELET # BLD AUTO: 32 THOUSANDS/UL (ref 149–390)
PLATELET # BLD AUTO: 60 THOUSANDS/UL (ref 149–390)
PLATELET # BLD AUTO: 9 THOUSANDS/UL (ref 149–390)
PLATELET BLD QL SMEAR: ADEQUATE
PMV BLD AUTO: 10.6 FL (ref 8.9–12.7)
PMV BLD AUTO: 11.5 FL (ref 8.9–12.7)
PMV BLD AUTO: 12 FL (ref 8.9–12.7)
PO2 BLD: 101.7 MM HG (ref 75–129)
PO2 BLD: 106 MM HG (ref 35–45)
PO2 BLD: 122.3 MM HG (ref 75–129)
PO2 BLD: 122.5 MM HG (ref 75–129)
PO2 BLD: 170 MM HG (ref 75–129)
PO2 BLD: 182 MM HG (ref 75–129)
PO2 BLD: 348.9 MM HG (ref 75–129)
PO2 BLD: 53 MM HG (ref 75–129)
PO2 BLD: 87 MM HG (ref 75–129)
PO2 BLDA: 101.1 MM HG (ref 75–129)
PO2 BLDA: 101.9 MM HG (ref 75–129)
PO2 BLDA: 129 MM HG (ref 75–129)
PO2 BLDA: 130.3 MM HG (ref 75–129)
PO2 BLDA: 131.2 MM HG (ref 75–129)
PO2 BLDA: 173.8 MM HG (ref 75–129)
PO2 BLDA: 366 MM HG (ref 75–129)
PO2 BLDA: 69.5 MM HG (ref 75–129)
PO2 BLDA: 85.7 MM HG (ref 75–129)
POTASSIUM BLD-SCNC: 4 MMOL/L (ref 3.5–5.3)
POTASSIUM BLD-SCNC: 4.2 MMOL/L (ref 3.5–5.3)
POTASSIUM BLD-SCNC: 4.9 MMOL/L (ref 3.5–5.3)
POTASSIUM SERPL-SCNC: 3.5 MMOL/L (ref 3.5–5.3)
POTASSIUM SERPL-SCNC: 3.5 MMOL/L (ref 3.5–5.3)
POTASSIUM SERPL-SCNC: 3.8 MMOL/L (ref 3.5–5.3)
POTASSIUM SERPL-SCNC: 4 MMOL/L (ref 3.5–5.3)
POTASSIUM SERPL-SCNC: 4.4 MMOL/L (ref 3.5–5.3)
POTASSIUM SERPL-SCNC: 4.5 MMOL/L (ref 3.5–5.3)
POTASSIUM SERPL-SCNC: 4.6 MMOL/L (ref 3.5–5.3)
POTASSIUM SERPL-SCNC: 4.7 MMOL/L (ref 3.5–5.3)
POTASSIUM SERPL-SCNC: 4.8 MMOL/L (ref 3.5–5.3)
POTASSIUM SERPL-SCNC: 4.9 MMOL/L (ref 3.5–5.3)
POTASSIUM SERPL-SCNC: 5 MMOL/L (ref 3.5–5.3)
POTASSIUM SERPL-SCNC: 5 MMOL/L (ref 3.5–5.3)
POTASSIUM SERPL-SCNC: 5.2 MMOL/L (ref 3.5–5.3)
POTASSIUM SERPL-SCNC: 5.3 MMOL/L (ref 3.5–5.3)
POTASSIUM SERPL-SCNC: 6.2 MMOL/L (ref 3.5–5.3)
PR INTERVAL: 117 MS
PR INTERVAL: 121 MS
PR INTERVAL: 121 MS
PR INTERVAL: 124 MS
PROT SERPL-MCNC: 5.5 G/DL (ref 6.4–8.2)
PROT SERPL-MCNC: 5.7 G/DL (ref 6.4–8.2)
PROT SERPL-MCNC: 6 G/DL (ref 6.4–8.2)
PROT SERPL-MCNC: 6.2 G/DL (ref 6.4–8.2)
PROT SERPL-MCNC: 8 G/DL (ref 6.4–8.2)
PROT SERPL-MCNC: 8.2 G/DL (ref 6.4–8.2)
PROTHROMBIN TIME: 17.5 SECONDS (ref 11.6–14.5)
PS VENT FIO2: 50
PS VENT PEEP: 5
QRS AXIS: 28 DEGREES
QRS AXIS: 32 DEGREES
QRS AXIS: 65 DEGREES
QRSD INTERVAL: 100 MS
QRSD INTERVAL: 102 MS
QRSD INTERVAL: 92 MS
QRSD INTERVAL: 92 MS
QRSD INTERVAL: 96 MS
QT INTERVAL: 332 MS
QT INTERVAL: 404 MS
QT INTERVAL: 404 MS
QT INTERVAL: 408 MS
QT INTERVAL: 496 MS
QTC INTERVAL: 494 MS
QTC INTERVAL: 495 MS
QTC INTERVAL: 499 MS
QTC INTERVAL: 500 MS
QTC INTERVAL: 583 MS
RBC # BLD AUTO: 2.64 MILLION/UL (ref 3.81–5.12)
RBC # BLD AUTO: 3.03 MILLION/UL (ref 3.81–5.12)
RBC # BLD AUTO: 3.19 MILLION/UL (ref 3.81–5.12)
RBC # BLD AUTO: 3.24 MILLION/UL (ref 3.81–5.12)
RBC # BLD AUTO: 3.3 MILLION/UL (ref 3.81–5.12)
RBC # BLD AUTO: 3.57 MILLION/UL (ref 3.81–5.12)
RBC # BLD AUTO: 3.69 MILLION/UL (ref 3.81–5.12)
RBC # BLD AUTO: 4.11 MILLION/UL (ref 3.81–5.12)
RBC MORPH BLD: PRESENT
RH BLD: POSITIVE
RH BLD: POSITIVE
SAO2 % BLD FROM PO2: 100 % (ref 60–85)
SAO2 % BLD FROM PO2: 96 % (ref 60–85)
SAO2 % BLD FROM PO2: 98 % (ref 60–85)
SARS-COV-2 RNA RESP QL NAA+PROBE: NEGATIVE
SODIUM BLD-SCNC: 136 MMOL/L (ref 136–145)
SODIUM BLD-SCNC: 141 MMOL/L (ref 136–145)
SODIUM BLD-SCNC: 145 MMOL/L (ref 136–145)
SODIUM SERPL-SCNC: 135 MMOL/L (ref 136–145)
SODIUM SERPL-SCNC: 135 MMOL/L (ref 136–145)
SODIUM SERPL-SCNC: 136 MMOL/L (ref 136–145)
SODIUM SERPL-SCNC: 137 MMOL/L (ref 136–145)
SODIUM SERPL-SCNC: 137 MMOL/L (ref 136–145)
SODIUM SERPL-SCNC: 138 MMOL/L (ref 136–145)
SODIUM SERPL-SCNC: 139 MMOL/L (ref 136–145)
SODIUM SERPL-SCNC: 141 MMOL/L (ref 136–145)
SODIUM SERPL-SCNC: 142 MMOL/L (ref 136–145)
SODIUM SERPL-SCNC: 142 MMOL/L (ref 136–145)
SODIUM SERPL-SCNC: 144 MMOL/L (ref 136–145)
SODIUM SERPL-SCNC: 144 MMOL/L (ref 136–145)
SODIUM SERPL-SCNC: 145 MMOL/L (ref 136–145)
SODIUM SERPL-SCNC: 147 MMOL/L (ref 136–145)
SODIUM SERPL-SCNC: 149 MMOL/L (ref 136–145)
SODIUM SERPL-SCNC: 152 MMOL/L (ref 136–145)
SODIUM SERPL-SCNC: 157 MMOL/L (ref 136–145)
SPECIMEN EXPIRATION DATE: NORMAL
SPECIMEN SOURCE: ABNORMAL
T WAVE AXIS: 215 DEGREES
T WAVE AXIS: 42 DEGREES
T WAVE AXIS: 58 DEGREES
T WAVE AXIS: 66 DEGREES
T WAVE AXIS: 80 DEGREES
TOTAL CELLS COUNTED SPEC: 100
TROPONIN I SERPL-MCNC: 0.03 NG/ML
TROPONIN I SERPL-MCNC: 3.02 NG/ML
TROPONIN I SERPL-MCNC: 5.79 NG/ML
TROPONIN I SERPL-MCNC: 8.63 NG/ML
TROPONIN I SERPL-MCNC: 9.31 NG/ML
UNIT DISPENSE STATUS: NORMAL
UNIT PRODUCT CODE: NORMAL
UNIT RH: NORMAL
VENT - PS: ABNORMAL
VENT AC: 16
VENT AC: 18
VENT AC: 22
VENT- AC: AC
VENTRICULAR RATE: 136 BPM
VENTRICULAR RATE: 83 BPM
VENTRICULAR RATE: 90 BPM
VT SETTING VENT: 300 ML
VT SETTING VENT: 350 ML
WBC # BLD AUTO: 4.59 THOUSAND/UL (ref 4.31–10.16)
WBC # BLD AUTO: 5.41 THOUSAND/UL (ref 4.31–10.16)
WBC # BLD AUTO: 5.48 THOUSAND/UL (ref 4.31–10.16)
WBC # BLD AUTO: 5.53 THOUSAND/UL (ref 4.31–10.16)
WBC # BLD AUTO: 5.9 THOUSAND/UL (ref 4.31–10.16)
WBC # BLD AUTO: 7.03 THOUSAND/UL (ref 4.31–10.16)
WBC # BLD AUTO: 8 THOUSAND/UL (ref 4.31–10.16)
WBC # BLD AUTO: 8.86 THOUSAND/UL (ref 4.31–10.16)

## 2020-01-01 PROCEDURE — 93005 ELECTROCARDIOGRAM TRACING: CPT

## 2020-01-01 PROCEDURE — 80048 BASIC METABOLIC PNL TOTAL CA: CPT | Performed by: NURSE PRACTITIONER

## 2020-01-01 PROCEDURE — 82948 REAGENT STRIP/BLOOD GLUCOSE: CPT

## 2020-01-01 PROCEDURE — 82805 BLOOD GASES W/O2 SATURATION: CPT | Performed by: EMERGENCY MEDICINE

## 2020-01-01 PROCEDURE — 71045 X-RAY EXAM CHEST 1 VIEW: CPT

## 2020-01-01 PROCEDURE — 36415 COLL VENOUS BLD VENIPUNCTURE: CPT | Performed by: EMERGENCY MEDICINE

## 2020-01-01 PROCEDURE — 94760 N-INVAS EAR/PLS OXIMETRY 1: CPT

## 2020-01-01 PROCEDURE — 86901 BLOOD TYPING SEROLOGIC RH(D): CPT | Performed by: NURSE PRACTITIONER

## 2020-01-01 PROCEDURE — 83010 ASSAY OF HAPTOGLOBIN QUANT: CPT | Performed by: NURSE PRACTITIONER

## 2020-01-01 PROCEDURE — 90945 DIALYSIS ONE EVALUATION: CPT

## 2020-01-01 PROCEDURE — 85027 COMPLETE CBC AUTOMATED: CPT | Performed by: NURSE PRACTITIONER

## 2020-01-01 PROCEDURE — 99284 EMERGENCY DEPT VISIT MOD MDM: CPT | Performed by: EMERGENCY MEDICINE

## 2020-01-01 PROCEDURE — 80048 BASIC METABOLIC PNL TOTAL CA: CPT | Performed by: INTERNAL MEDICINE

## 2020-01-01 PROCEDURE — 85014 HEMATOCRIT: CPT | Performed by: ANESTHESIOLOGY

## 2020-01-01 PROCEDURE — 95715 VEEG EA 12-26HR INTMT MNTR: CPT

## 2020-01-01 PROCEDURE — 99291 CRITICAL CARE FIRST HOUR: CPT | Performed by: EMERGENCY MEDICINE

## 2020-01-01 PROCEDURE — 99233 SBSQ HOSP IP/OBS HIGH 50: CPT | Performed by: PHYSICIAN ASSISTANT

## 2020-01-01 PROCEDURE — NC001 PR NO CHARGE: Performed by: PSYCHIATRY & NEUROLOGY

## 2020-01-01 PROCEDURE — 83615 LACTATE (LD) (LDH) ENZYME: CPT | Performed by: NURSE PRACTITIONER

## 2020-01-01 PROCEDURE — 36556 INSERT NON-TUNNEL CV CATH: CPT | Performed by: ANESTHESIOLOGY

## 2020-01-01 PROCEDURE — 99233 SBSQ HOSP IP/OBS HIGH 50: CPT | Performed by: INTERNAL MEDICINE

## 2020-01-01 PROCEDURE — 84100 ASSAY OF PHOSPHORUS: CPT | Performed by: PHYSICIAN ASSISTANT

## 2020-01-01 PROCEDURE — 92950 HEART/LUNG RESUSCITATION CPR: CPT

## 2020-01-01 PROCEDURE — NC001 PR NO CHARGE: Performed by: EMERGENCY MEDICINE

## 2020-01-01 PROCEDURE — 90945 DIALYSIS ONE EVALUATION: CPT | Performed by: INTERNAL MEDICINE

## 2020-01-01 PROCEDURE — 80048 BASIC METABOLIC PNL TOTAL CA: CPT | Performed by: PHYSICIAN ASSISTANT

## 2020-01-01 PROCEDURE — 31500 INSERT EMERGENCY AIRWAY: CPT | Performed by: EMERGENCY MEDICINE

## 2020-01-01 PROCEDURE — 85027 COMPLETE CBC AUTOMATED: CPT | Performed by: EMERGENCY MEDICINE

## 2020-01-01 PROCEDURE — 85730 THROMBOPLASTIN TIME PARTIAL: CPT | Performed by: EMERGENCY MEDICINE

## 2020-01-01 PROCEDURE — 83735 ASSAY OF MAGNESIUM: CPT | Performed by: PHYSICIAN ASSISTANT

## 2020-01-01 PROCEDURE — 82330 ASSAY OF CALCIUM: CPT | Performed by: INTERNAL MEDICINE

## 2020-01-01 PROCEDURE — 74176 CT ABD & PELVIS W/O CONTRAST: CPT

## 2020-01-01 PROCEDURE — 94002 VENT MGMT INPAT INIT DAY: CPT

## 2020-01-01 PROCEDURE — 96374 THER/PROPH/DIAG INJ IV PUSH: CPT

## 2020-01-01 PROCEDURE — 87081 CULTURE SCREEN ONLY: CPT | Performed by: ANESTHESIOLOGY

## 2020-01-01 PROCEDURE — 84295 ASSAY OF SERUM SODIUM: CPT

## 2020-01-01 PROCEDURE — 83605 ASSAY OF LACTIC ACID: CPT | Performed by: EMERGENCY MEDICINE

## 2020-01-01 PROCEDURE — 84132 ASSAY OF SERUM POTASSIUM: CPT

## 2020-01-01 PROCEDURE — 82330 ASSAY OF CALCIUM: CPT | Performed by: PHYSICIAN ASSISTANT

## 2020-01-01 PROCEDURE — 85025 COMPLETE CBC W/AUTO DIFF WBC: CPT | Performed by: EMERGENCY MEDICINE

## 2020-01-01 PROCEDURE — 87077 CULTURE AEROBIC IDENTIFY: CPT | Performed by: NURSE PRACTITIONER

## 2020-01-01 PROCEDURE — 99222 1ST HOSP IP/OBS MODERATE 55: CPT | Performed by: SURGERY

## 2020-01-01 PROCEDURE — 87070 CULTURE OTHR SPECIMN AEROBIC: CPT | Performed by: NURSE PRACTITIONER

## 2020-01-01 PROCEDURE — 99291 CRITICAL CARE FIRST HOUR: CPT

## 2020-01-01 PROCEDURE — 84100 ASSAY OF PHOSPHORUS: CPT | Performed by: INTERNAL MEDICINE

## 2020-01-01 PROCEDURE — 94003 VENT MGMT INPAT SUBQ DAY: CPT

## 2020-01-01 PROCEDURE — 99291 CRITICAL CARE FIRST HOUR: CPT | Performed by: ANESTHESIOLOGY

## 2020-01-01 PROCEDURE — 82330 ASSAY OF CALCIUM: CPT

## 2020-01-01 PROCEDURE — 0BH17EZ INSERTION OF ENDOTRACHEAL AIRWAY INTO TRACHEA, VIA NATURAL OR ARTIFICIAL OPENING: ICD-10-PCS | Performed by: ANESTHESIOLOGY

## 2020-01-01 PROCEDURE — 5A1945Z RESPIRATORY VENTILATION, 24-96 CONSECUTIVE HOURS: ICD-10-PCS | Performed by: EMERGENCY MEDICINE

## 2020-01-01 PROCEDURE — P9016 RBC LEUKOCYTES REDUCED: HCPCS

## 2020-01-01 PROCEDURE — 85007 BL SMEAR W/DIFF WBC COUNT: CPT | Performed by: EMERGENCY MEDICINE

## 2020-01-01 PROCEDURE — 83735 ASSAY OF MAGNESIUM: CPT | Performed by: EMERGENCY MEDICINE

## 2020-01-01 PROCEDURE — 96365 THER/PROPH/DIAG IV INF INIT: CPT

## 2020-01-01 PROCEDURE — 5A1D90Z PERFORMANCE OF URINARY FILTRATION, CONTINUOUS, GREATER THAN 18 HOURS PER DAY: ICD-10-PCS | Performed by: INTERNAL MEDICINE

## 2020-01-01 PROCEDURE — 80076 HEPATIC FUNCTION PANEL: CPT | Performed by: PHYSICIAN ASSISTANT

## 2020-01-01 PROCEDURE — 95700 EEG CONT REC W/VID EEG TECH: CPT

## 2020-01-01 PROCEDURE — 84484 ASSAY OF TROPONIN QUANT: CPT | Performed by: EMERGENCY MEDICINE

## 2020-01-01 PROCEDURE — 31500 INSERT EMERGENCY AIRWAY: CPT

## 2020-01-01 PROCEDURE — 82803 BLOOD GASES ANY COMBINATION: CPT

## 2020-01-01 PROCEDURE — 99284 EMERGENCY DEPT VISIT MOD MDM: CPT

## 2020-01-01 PROCEDURE — 86022 PLATELET ANTIBODIES: CPT | Performed by: NURSE PRACTITIONER

## 2020-01-01 PROCEDURE — 82805 BLOOD GASES W/O2 SATURATION: CPT | Performed by: NURSE PRACTITIONER

## 2020-01-01 PROCEDURE — 84100 ASSAY OF PHOSPHORUS: CPT | Performed by: EMERGENCY MEDICINE

## 2020-01-01 PROCEDURE — 83036 HEMOGLOBIN GLYCOSYLATED A1C: CPT | Performed by: PHYSICIAN ASSISTANT

## 2020-01-01 PROCEDURE — NC001 PR NO CHARGE: Performed by: ANESTHESIOLOGY

## 2020-01-01 PROCEDURE — 85362 FIBRIN DEGRADATION PRODUCTS: CPT | Performed by: NURSE PRACTITIONER

## 2020-01-01 PROCEDURE — 84484 ASSAY OF TROPONIN QUANT: CPT | Performed by: PHYSICIAN ASSISTANT

## 2020-01-01 PROCEDURE — 85027 COMPLETE CBC AUTOMATED: CPT | Performed by: PHYSICIAN ASSISTANT

## 2020-01-01 PROCEDURE — 83605 ASSAY OF LACTIC ACID: CPT | Performed by: PHYSICIAN ASSISTANT

## 2020-01-01 PROCEDURE — 82805 BLOOD GASES W/O2 SATURATION: CPT | Performed by: PHYSICIAN ASSISTANT

## 2020-01-01 PROCEDURE — 36556 INSERT NON-TUNNEL CV CATH: CPT | Performed by: PHYSICIAN ASSISTANT

## 2020-01-01 PROCEDURE — 82947 ASSAY GLUCOSE BLOOD QUANT: CPT | Performed by: ANESTHESIOLOGY

## 2020-01-01 PROCEDURE — 99283 EMERGENCY DEPT VISIT LOW MDM: CPT

## 2020-01-01 PROCEDURE — 83880 ASSAY OF NATRIURETIC PEPTIDE: CPT | Performed by: EMERGENCY MEDICINE

## 2020-01-01 PROCEDURE — 93010 ELECTROCARDIOGRAM REPORT: CPT | Performed by: INTERNAL MEDICINE

## 2020-01-01 PROCEDURE — 99223 1ST HOSP IP/OBS HIGH 75: CPT | Performed by: INTERNAL MEDICINE

## 2020-01-01 PROCEDURE — 5A1935Z RESPIRATORY VENTILATION, LESS THAN 24 CONSECUTIVE HOURS: ICD-10-PCS | Performed by: EMERGENCY MEDICINE

## 2020-01-01 PROCEDURE — NC001 PR NO CHARGE: Performed by: INTERNAL MEDICINE

## 2020-01-01 PROCEDURE — 70450 CT HEAD/BRAIN W/O DYE: CPT

## 2020-01-01 PROCEDURE — 85049 AUTOMATED PLATELET COUNT: CPT | Performed by: PHYSICIAN ASSISTANT

## 2020-01-01 PROCEDURE — 82542 COL CHROMOTOGRAPHY QUAL/QUAN: CPT | Performed by: NURSE PRACTITIONER

## 2020-01-01 PROCEDURE — 85730 THROMBOPLASTIN TIME PARTIAL: CPT | Performed by: NURSE PRACTITIONER

## 2020-01-01 PROCEDURE — 82947 ASSAY GLUCOSE BLOOD QUANT: CPT

## 2020-01-01 PROCEDURE — 85384 FIBRINOGEN ACTIVITY: CPT | Performed by: NURSE PRACTITIONER

## 2020-01-01 PROCEDURE — 87186 SC STD MICRODIL/AGAR DIL: CPT | Performed by: NURSE PRACTITIONER

## 2020-01-01 PROCEDURE — 5A12012 PERFORMANCE OF CARDIAC OUTPUT, SINGLE, MANUAL: ICD-10-PCS | Performed by: ANESTHESIOLOGY

## 2020-01-01 PROCEDURE — 99292 CRITICAL CARE ADDL 30 MIN: CPT | Performed by: ANESTHESIOLOGY

## 2020-01-01 PROCEDURE — NC001 PR NO CHARGE: Performed by: PHYSICIAN ASSISTANT

## 2020-01-01 PROCEDURE — 36620 INSERTION CATHETER ARTERY: CPT | Performed by: ANESTHESIOLOGY

## 2020-01-01 PROCEDURE — 31622 DX BRONCHOSCOPE/WASH: CPT | Performed by: EMERGENCY MEDICINE

## 2020-01-01 PROCEDURE — 71250 CT THORAX DX C-: CPT

## 2020-01-01 PROCEDURE — 36600 WITHDRAWAL OF ARTERIAL BLOOD: CPT

## 2020-01-01 PROCEDURE — 02HV33Z INSERTION OF INFUSION DEVICE INTO SUPERIOR VENA CAVA, PERCUTANEOUS APPROACH: ICD-10-PCS | Performed by: EMERGENCY MEDICINE

## 2020-01-01 PROCEDURE — 0BC78ZZ EXTIRPATION OF MATTER FROM LEFT MAIN BRONCHUS, VIA NATURAL OR ARTIFICIAL OPENING ENDOSCOPIC: ICD-10-PCS | Performed by: EMERGENCY MEDICINE

## 2020-01-01 PROCEDURE — 85018 HEMOGLOBIN: CPT | Performed by: ANESTHESIOLOGY

## 2020-01-01 PROCEDURE — 83735 ASSAY OF MAGNESIUM: CPT | Performed by: INTERNAL MEDICINE

## 2020-01-01 PROCEDURE — 85018 HEMOGLOBIN: CPT | Performed by: NURSE PRACTITIONER

## 2020-01-01 PROCEDURE — 99283 EMERGENCY DEPT VISIT LOW MDM: CPT | Performed by: EMERGENCY MEDICINE

## 2020-01-01 PROCEDURE — 95720 EEG PHY/QHP EA INCR W/VEEG: CPT | Performed by: PSYCHIATRY & NEUROLOGY

## 2020-01-01 PROCEDURE — 80053 COMPREHEN METABOLIC PANEL: CPT | Performed by: EMERGENCY MEDICINE

## 2020-01-01 PROCEDURE — 30233N1 TRANSFUSION OF NONAUTOLOGOUS RED BLOOD CELLS INTO PERIPHERAL VEIN, PERCUTANEOUS APPROACH: ICD-10-PCS | Performed by: EMERGENCY MEDICINE

## 2020-01-01 PROCEDURE — 86850 RBC ANTIBODY SCREEN: CPT | Performed by: NURSE PRACTITIONER

## 2020-01-01 PROCEDURE — 95722 EEG PHY/QHP>36<60 HR W/VEEG: CPT | Performed by: PSYCHIATRY & NEUROLOGY

## 2020-01-01 PROCEDURE — 72125 CT NECK SPINE W/O DYE: CPT

## 2020-01-01 PROCEDURE — 85014 HEMATOCRIT: CPT

## 2020-01-01 PROCEDURE — 86900 BLOOD TYPING SEROLOGIC ABO: CPT | Performed by: NURSE PRACTITIONER

## 2020-01-01 PROCEDURE — 86920 COMPATIBILITY TEST SPIN: CPT

## 2020-01-01 PROCEDURE — 87635 SARS-COV-2 COVID-19 AMP PRB: CPT | Performed by: EMERGENCY MEDICINE

## 2020-01-01 PROCEDURE — 93306 TTE W/DOPPLER COMPLETE: CPT

## 2020-01-01 PROCEDURE — 80076 HEPATIC FUNCTION PANEL: CPT | Performed by: NURSE PRACTITIONER

## 2020-01-01 PROCEDURE — 87205 SMEAR GRAM STAIN: CPT | Performed by: NURSE PRACTITIONER

## 2020-01-01 PROCEDURE — 93306 TTE W/DOPPLER COMPLETE: CPT | Performed by: INTERNAL MEDICINE

## 2020-01-01 PROCEDURE — 85610 PROTHROMBIN TIME: CPT | Performed by: NURSE PRACTITIONER

## 2020-01-01 PROCEDURE — 94150 VITAL CAPACITY TEST: CPT

## 2020-01-01 RX ORDER — PROPOFOL 10 MG/ML
INJECTION, EMULSION INTRAVENOUS
Status: COMPLETED
Start: 2020-01-01 | End: 2020-01-01

## 2020-01-01 RX ORDER — CHLORHEXIDINE GLUCONATE 0.12 MG/ML
15 RINSE ORAL EVERY 12 HOURS SCHEDULED
Status: DISCONTINUED | OUTPATIENT
Start: 2020-01-01 | End: 2020-01-01 | Stop reason: SDUPTHER

## 2020-01-01 RX ORDER — OLANZAPINE 5 MG/1
5 TABLET, ORALLY DISINTEGRATING ORAL ONCE
Status: DISCONTINUED | OUTPATIENT
Start: 2020-01-01 | End: 2020-01-01

## 2020-01-01 RX ORDER — ACETAMINOPHEN 160 MG/5ML
975 SUSPENSION, ORAL (FINAL DOSE FORM) ORAL EVERY 6 HOURS
Status: DISCONTINUED | OUTPATIENT
Start: 2020-01-01 | End: 2020-01-01 | Stop reason: HOSPADM

## 2020-01-01 RX ORDER — LORAZEPAM 2 MG/ML
2 INJECTION INTRAMUSCULAR EVERY 4 HOURS PRN
Status: DISCONTINUED | OUTPATIENT
Start: 2020-01-01 | End: 2020-01-01 | Stop reason: HOSPADM

## 2020-01-01 RX ORDER — CALCIUM GLUCONATE 20 MG/ML
1 INJECTION, SOLUTION INTRAVENOUS ONCE
Status: COMPLETED | OUTPATIENT
Start: 2020-01-01 | End: 2020-01-01

## 2020-01-01 RX ORDER — SODIUM BICARBONATE 84 MG/ML
INJECTION, SOLUTION INTRAVENOUS CODE/TRAUMA/SEDATION MEDICATION
Status: COMPLETED | OUTPATIENT
Start: 2020-01-01 | End: 2020-01-01

## 2020-01-01 RX ORDER — DEXTROSE MONOHYDRATE 25 G/50ML
INJECTION, SOLUTION INTRAVENOUS
Status: DISCONTINUED
Start: 2020-01-01 | End: 2020-01-01 | Stop reason: WASHOUT

## 2020-01-01 RX ORDER — FENTANYL CITRATE 50 UG/ML
INJECTION, SOLUTION INTRAMUSCULAR; INTRAVENOUS
Status: COMPLETED
Start: 2020-01-01 | End: 2020-01-01

## 2020-01-01 RX ORDER — POTASSIUM CHLORIDE 29.8 MG/ML
40 INJECTION INTRAVENOUS ONCE
Status: COMPLETED | OUTPATIENT
Start: 2020-01-01 | End: 2020-01-01

## 2020-01-01 RX ORDER — DEXTROSE MONOHYDRATE 25 G/50ML
INJECTION, SOLUTION INTRAVENOUS CODE/TRAUMA/SEDATION MEDICATION
Status: COMPLETED | OUTPATIENT
Start: 2020-01-01 | End: 2020-01-01

## 2020-01-01 RX ORDER — MIDAZOLAM HYDROCHLORIDE 2 MG/2ML
2 INJECTION, SOLUTION INTRAMUSCULAR; INTRAVENOUS ONCE
Status: COMPLETED | OUTPATIENT
Start: 2020-01-01 | End: 2020-01-01

## 2020-01-01 RX ORDER — DEXTROSE MONOHYDRATE 25 G/50ML
25 INJECTION, SOLUTION INTRAVENOUS ONCE
Status: COMPLETED | OUTPATIENT
Start: 2020-01-01 | End: 2020-01-01

## 2020-01-01 RX ORDER — PROPOFOL 10 MG/ML
5-50 INJECTION, EMULSION INTRAVENOUS
Status: DISCONTINUED | OUTPATIENT
Start: 2020-01-01 | End: 2020-01-01

## 2020-01-01 RX ORDER — ATORVASTATIN CALCIUM 40 MG/1
40 TABLET, FILM COATED ORAL
Status: DISCONTINUED | OUTPATIENT
Start: 2020-01-01 | End: 2020-01-01 | Stop reason: HOSPADM

## 2020-01-01 RX ORDER — CALCIUM CHLORIDE 100 MG/ML
SYRINGE (ML) INTRAVENOUS CODE/TRAUMA/SEDATION MEDICATION
Status: COMPLETED | OUTPATIENT
Start: 2020-01-01 | End: 2020-01-01

## 2020-01-01 RX ORDER — MIDAZOLAM HYDROCHLORIDE 2 MG/2ML
2 INJECTION, SOLUTION INTRAMUSCULAR; INTRAVENOUS ONCE
Status: DISCONTINUED | OUTPATIENT
Start: 2020-01-01 | End: 2020-01-01

## 2020-01-01 RX ORDER — LIDOCAINE HYDROCHLORIDE 10 MG/ML
10 INJECTION, SOLUTION EPIDURAL; INFILTRATION; INTRACAUDAL; PERINEURAL ONCE
Status: COMPLETED | OUTPATIENT
Start: 2020-01-01 | End: 2020-01-01

## 2020-01-01 RX ORDER — HEPARIN SODIUM 5000 [USP'U]/ML
5000 INJECTION, SOLUTION INTRAVENOUS; SUBCUTANEOUS EVERY 8 HOURS SCHEDULED
Status: CANCELLED | OUTPATIENT
Start: 2020-01-01 | Stop reason: SDUPTHER

## 2020-01-01 RX ORDER — ACETAMINOPHEN 325 MG/1
975 TABLET ORAL EVERY 6 HOURS
Status: DISCONTINUED | OUTPATIENT
Start: 2020-01-01 | End: 2020-01-01

## 2020-01-01 RX ORDER — FENTANYL CITRATE 50 UG/ML
100 INJECTION, SOLUTION INTRAMUSCULAR; INTRAVENOUS ONCE
Status: COMPLETED | OUTPATIENT
Start: 2020-01-01 | End: 2020-01-01

## 2020-01-01 RX ORDER — EPINEPHRINE 0.1 MG/ML
SYRINGE (ML) INJECTION CODE/TRAUMA/SEDATION MEDICATION
Status: COMPLETED | OUTPATIENT
Start: 2020-01-01 | End: 2020-01-01

## 2020-01-01 RX ORDER — CHLORHEXIDINE GLUCONATE 0.12 MG/ML
15 RINSE ORAL EVERY 12 HOURS SCHEDULED
Status: CANCELLED | OUTPATIENT
Start: 2020-01-01

## 2020-01-01 RX ORDER — MIDODRINE HYDROCHLORIDE 5 MG/1
10 TABLET ORAL 3 TIMES DAILY
Status: DISCONTINUED | OUTPATIENT
Start: 2020-01-01 | End: 2020-01-01

## 2020-01-01 RX ORDER — FENTANYL CITRATE 50 UG/ML
50 INJECTION, SOLUTION INTRAMUSCULAR; INTRAVENOUS EVERY 2 HOUR PRN
Status: DISCONTINUED | OUTPATIENT
Start: 2020-01-01 | End: 2020-01-01 | Stop reason: HOSPADM

## 2020-01-01 RX ORDER — HEPARIN SODIUM 10000 [USP'U]/100ML
500 INJECTION, SOLUTION INTRAVENOUS CONTINUOUS
Status: DISCONTINUED | OUTPATIENT
Start: 2020-01-01 | End: 2020-01-01 | Stop reason: HOSPADM

## 2020-01-01 RX ORDER — FENTANYL CITRATE 50 UG/ML
25 INJECTION, SOLUTION INTRAMUSCULAR; INTRAVENOUS ONCE
Status: DISCONTINUED | OUTPATIENT
Start: 2020-01-01 | End: 2020-01-01 | Stop reason: HOSPADM

## 2020-01-01 RX ORDER — FENTANYL CITRATE/PF 50 MCG/ML
50 SYRINGE (ML) INJECTION EVERY 2 HOUR PRN
Status: DISCONTINUED | OUTPATIENT
Start: 2020-01-01 | End: 2020-01-01

## 2020-01-01 RX ORDER — HEPARIN SODIUM 5000 [USP'U]/ML
5000 INJECTION, SOLUTION INTRAVENOUS; SUBCUTANEOUS EVERY 8 HOURS SCHEDULED
Status: DISCONTINUED | OUTPATIENT
Start: 2020-01-01 | End: 2020-01-01 | Stop reason: SDUPTHER

## 2020-01-01 RX ORDER — ATORVASTATIN CALCIUM 40 MG/1
40 TABLET, FILM COATED ORAL
Status: CANCELLED | OUTPATIENT
Start: 2020-01-01

## 2020-01-01 RX ORDER — DEXTROSE MONOHYDRATE 25 G/50ML
INJECTION, SOLUTION INTRAVENOUS
Status: COMPLETED
Start: 2020-01-01 | End: 2020-01-01

## 2020-01-01 RX ORDER — LORAZEPAM 2 MG/ML
2 INJECTION INTRAMUSCULAR EVERY 4 HOURS PRN
Status: CANCELLED | OUTPATIENT
Start: 2020-01-01

## 2020-01-01 RX ORDER — MIDAZOLAM HYDROCHLORIDE 2 MG/2ML
INJECTION, SOLUTION INTRAMUSCULAR; INTRAVENOUS
Status: COMPLETED
Start: 2020-01-01 | End: 2020-01-01

## 2020-01-01 RX ORDER — MIDODRINE HYDROCHLORIDE 5 MG/1
5 TABLET ORAL ONCE
Status: COMPLETED | OUTPATIENT
Start: 2020-01-01 | End: 2020-01-01

## 2020-01-01 RX ORDER — MIDAZOLAM HYDROCHLORIDE 2 MG/2ML
4 INJECTION, SOLUTION INTRAMUSCULAR; INTRAVENOUS ONCE
Status: COMPLETED | OUTPATIENT
Start: 2020-01-01 | End: 2020-01-01

## 2020-01-01 RX ORDER — ATORVASTATIN CALCIUM 40 MG/1
40 TABLET, FILM COATED ORAL DAILY
COMMUNITY
End: 2020-01-01 | Stop reason: HOSPADM

## 2020-01-01 RX ORDER — FENTANYL CITRATE 50 UG/ML
50 INJECTION, SOLUTION INTRAMUSCULAR; INTRAVENOUS ONCE
Status: COMPLETED | OUTPATIENT
Start: 2020-01-01 | End: 2020-01-01

## 2020-01-01 RX ORDER — HEPARIN SODIUM 5000 [USP'U]/ML
5000 INJECTION, SOLUTION INTRAVENOUS; SUBCUTANEOUS EVERY 8 HOURS SCHEDULED
Status: DISCONTINUED | OUTPATIENT
Start: 2020-01-01 | End: 2020-01-01

## 2020-01-01 RX ORDER — PROPOFOL 10 MG/ML
5-50 INJECTION, EMULSION INTRAVENOUS
Status: DISCONTINUED | OUTPATIENT
Start: 2020-01-01 | End: 2020-01-01 | Stop reason: HOSPADM

## 2020-01-01 RX ORDER — CHLORHEXIDINE GLUCONATE 0.12 MG/ML
15 RINSE ORAL EVERY 12 HOURS SCHEDULED
Status: DISCONTINUED | OUTPATIENT
Start: 2020-01-01 | End: 2020-01-01 | Stop reason: HOSPADM

## 2020-01-01 RX ORDER — CHLORHEXIDINE GLUCONATE 0.12 MG/ML
15 RINSE ORAL EVERY 12 HOURS SCHEDULED
Status: DISCONTINUED | OUTPATIENT
Start: 2020-01-01 | End: 2020-01-01

## 2020-01-01 RX ORDER — ARGATROBAN 1 MG/ML
.05-3 INJECTION INTRAVENOUS
Status: DISCONTINUED | OUTPATIENT
Start: 2020-01-01 | End: 2020-01-01 | Stop reason: HOSPADM

## 2020-01-01 RX ORDER — LORAZEPAM 2 MG/ML
2 INJECTION INTRAMUSCULAR EVERY 4 HOURS PRN
Status: DISCONTINUED | OUTPATIENT
Start: 2020-01-01 | End: 2020-01-01

## 2020-01-01 RX ORDER — MIDODRINE HYDROCHLORIDE 10 MG/1
10 TABLET ORAL 3 TIMES DAILY
COMMUNITY
End: 2020-01-01 | Stop reason: HOSPADM

## 2020-01-01 RX ORDER — MIDODRINE HYDROCHLORIDE 5 MG/1
15 TABLET ORAL 3 TIMES DAILY
Status: DISCONTINUED | OUTPATIENT
Start: 2020-01-01 | End: 2020-01-01 | Stop reason: HOSPADM

## 2020-01-01 RX ORDER — HYDROMORPHONE HCL/PF 1 MG/ML
1 SYRINGE (ML) INJECTION
Status: DISCONTINUED | OUTPATIENT
Start: 2020-01-01 | End: 2020-01-01 | Stop reason: HOSPADM

## 2020-01-01 RX ORDER — FENTANYL CITRATE-0.9 % NACL/PF 10 MCG/ML
25 PLASTIC BAG, INJECTION (ML) INTRAVENOUS CONTINUOUS
Status: DISCONTINUED | OUTPATIENT
Start: 2020-01-01 | End: 2020-01-01

## 2020-01-01 RX ORDER — MAGNESIUM SULFATE 1 G/100ML
1 INJECTION INTRAVENOUS ONCE
Status: COMPLETED | OUTPATIENT
Start: 2020-01-01 | End: 2020-01-01

## 2020-01-01 RX ADMIN — DEXTROSE MONOHYDRATE 25 G: 25 INJECTION, SOLUTION INTRAVENOUS at 00:36

## 2020-01-01 RX ADMIN — CHLORHEXIDINE GLUCONATE 0.12% ORAL RINSE 15 ML: 1.2 LIQUID ORAL at 08:01

## 2020-01-01 RX ADMIN — LORAZEPAM 2 MG: 2 INJECTION INTRAMUSCULAR; INTRAVENOUS at 06:09

## 2020-01-01 RX ADMIN — CHLORHEXIDINE GLUCONATE 0.12% ORAL RINSE 15 ML: 1.2 LIQUID ORAL at 08:34

## 2020-01-01 RX ADMIN — FENTANYL CITRATE 50 MCG: 50 INJECTION, SOLUTION INTRAMUSCULAR; INTRAVENOUS at 21:20

## 2020-01-01 RX ADMIN — ATORVASTATIN CALCIUM 40 MG: 40 TABLET, FILM COATED ORAL at 16:10

## 2020-01-01 RX ADMIN — NICOTINE 7 MG: 7 PATCH TRANSDERMAL at 08:01

## 2020-01-01 RX ADMIN — PROPOFOL INJECTABLE EMULSION 20 MCG/KG/MIN: 10 INJECTION, EMULSION INTRAVENOUS at 05:26

## 2020-01-01 RX ADMIN — CALCIUM GLUCONATE 1 G: 20 INJECTION, SOLUTION INTRAVENOUS at 12:33

## 2020-01-01 RX ADMIN — LORAZEPAM 2 MG: 2 INJECTION INTRAMUSCULAR; INTRAVENOUS at 14:45

## 2020-01-01 RX ADMIN — ACETAMINOPHEN 975 MG: 650 SUSPENSION ORAL at 06:25

## 2020-01-01 RX ADMIN — ACETAMINOPHEN 975 MG: 325 TABLET ORAL at 05:58

## 2020-01-01 RX ADMIN — SODIUM BICARBONATE 50 MEQ: 84 INJECTION, SOLUTION INTRAVENOUS at 14:54

## 2020-01-01 RX ADMIN — SODIUM CHLORIDE 2 MCG/MIN: 0.9 INJECTION, SOLUTION INTRAVENOUS at 06:41

## 2020-01-01 RX ADMIN — NICOTINE 7 MG: 7 PATCH TRANSDERMAL at 08:02

## 2020-01-01 RX ADMIN — ACETAMINOPHEN 975 MG: 650 SUSPENSION ORAL at 00:01

## 2020-01-01 RX ADMIN — MIDODRINE HYDROCHLORIDE 10 MG: 5 TABLET ORAL at 21:29

## 2020-01-01 RX ADMIN — MIDODRINE HYDROCHLORIDE 15 MG: 5 TABLET ORAL at 16:19

## 2020-01-01 RX ADMIN — ATORVASTATIN CALCIUM 40 MG: 40 TABLET, FILM COATED ORAL at 15:36

## 2020-01-01 RX ADMIN — CALCIUM GLUCONATE 1 G: 20 INJECTION, SOLUTION INTRAVENOUS at 19:51

## 2020-01-01 RX ADMIN — CALCIUM GLUCONATE 1 G: 20 INJECTION, SOLUTION INTRAVENOUS at 07:48

## 2020-01-01 RX ADMIN — ACETAMINOPHEN 975 MG: 650 SUSPENSION ORAL at 17:10

## 2020-01-01 RX ADMIN — SODIUM CHLORIDE 4 MCG/MIN: 0.9 INJECTION, SOLUTION INTRAVENOUS at 08:19

## 2020-01-01 RX ADMIN — HEPARIN SODIUM 5000 UNITS: 5000 INJECTION INTRAVENOUS; SUBCUTANEOUS at 21:30

## 2020-01-01 RX ADMIN — MIDODRINE HYDROCHLORIDE 15 MG: 5 TABLET ORAL at 20:20

## 2020-01-01 RX ADMIN — PROPOFOL 1000000 MCG: 10 INJECTION, EMULSION INTRAVENOUS at 20:41

## 2020-01-01 RX ADMIN — POTASSIUM CHLORIDE 40 MEQ: 29.8 INJECTION, SOLUTION INTRAVENOUS at 07:57

## 2020-01-01 RX ADMIN — Medication 20000 ML: at 01:34

## 2020-01-01 RX ADMIN — DEXTROSE MONOHYDRATE 25 G: 500 INJECTION PARENTERAL at 00:36

## 2020-01-01 RX ADMIN — LIDOCAINE HYDROCHLORIDE 10 ML: 10 INJECTION, SOLUTION EPIDURAL; INFILTRATION; INTRACAUDAL; PERINEURAL at 09:45

## 2020-01-01 RX ADMIN — FENTANYL CITRATE 100 MCG: 50 INJECTION, SOLUTION INTRAMUSCULAR; INTRAVENOUS at 15:45

## 2020-01-01 RX ADMIN — MIDODRINE HYDROCHLORIDE 10 MG: 5 TABLET ORAL at 08:03

## 2020-01-01 RX ADMIN — ACETAMINOPHEN 975 MG: 650 SUSPENSION ORAL at 17:06

## 2020-01-01 RX ADMIN — ACETAMINOPHEN 975 MG: 650 SUSPENSION ORAL at 04:13

## 2020-01-01 RX ADMIN — ARGATROBAN 0.4 MCG/KG/MIN: 50 INJECTION, SOLUTION INTRAVENOUS at 14:19

## 2020-01-01 RX ADMIN — Medication 20 MG: at 08:56

## 2020-01-01 RX ADMIN — SODIUM CHLORIDE 7 UNITS/HR: 9 INJECTION, SOLUTION INTRAVENOUS at 14:29

## 2020-01-01 RX ADMIN — MIDODRINE HYDROCHLORIDE 10 MG: 5 TABLET ORAL at 08:01

## 2020-01-01 RX ADMIN — MIDODRINE HYDROCHLORIDE 10 MG: 5 TABLET ORAL at 21:28

## 2020-01-01 RX ADMIN — MIDODRINE HYDROCHLORIDE 10 MG: 5 TABLET ORAL at 01:30

## 2020-01-01 RX ADMIN — HEPARIN SODIUM 5000 UNITS: 5000 INJECTION INTRAVENOUS; SUBCUTANEOUS at 05:25

## 2020-01-01 RX ADMIN — EPINEPHRINE 1 MG: 0.1 INJECTION, SOLUTION ENDOTRACHEAL; INTRACARDIAC; INTRAVENOUS at 07:43

## 2020-01-01 RX ADMIN — SODIUM CHLORIDE 8 MCG/MIN: 0.9 INJECTION, SOLUTION INTRAVENOUS at 22:45

## 2020-01-01 RX ADMIN — PROPOFOL 10 MCG/KG/MIN: 10 INJECTION, EMULSION INTRAVENOUS at 14:27

## 2020-01-01 RX ADMIN — SODIUM BICARBONATE 50 MEQ: 84 INJECTION, SOLUTION INTRAVENOUS at 14:46

## 2020-01-01 RX ADMIN — SODIUM CHLORIDE 7 MCG/MIN: 0.9 INJECTION, SOLUTION INTRAVENOUS at 00:42

## 2020-01-01 RX ADMIN — HEPARIN SODIUM 5000 UNITS: 5000 INJECTION INTRAVENOUS; SUBCUTANEOUS at 15:28

## 2020-01-01 RX ADMIN — LORAZEPAM 2 MG: 2 INJECTION INTRAMUSCULAR; INTRAVENOUS at 20:12

## 2020-01-01 RX ADMIN — Medication 20 MG: at 16:07

## 2020-01-01 RX ADMIN — ATORVASTATIN CALCIUM 40 MG: 40 TABLET, FILM COATED ORAL at 16:19

## 2020-01-01 RX ADMIN — MIDAZOLAM 4 MG: 1 INJECTION INTRAMUSCULAR; INTRAVENOUS at 09:52

## 2020-01-01 RX ADMIN — ACETAMINOPHEN 975 MG: 650 SUSPENSION ORAL at 23:50

## 2020-01-01 RX ADMIN — MIDODRINE HYDROCHLORIDE 10 MG: 5 TABLET ORAL at 17:10

## 2020-01-01 RX ADMIN — Medication 20000 ML: at 11:41

## 2020-01-01 RX ADMIN — EPINEPHRINE 1 MG: 0.1 INJECTION, SOLUTION ENDOTRACHEAL; INTRACARDIAC; INTRAVENOUS at 14:40

## 2020-01-01 RX ADMIN — CHLORHEXIDINE GLUCONATE 0.12% ORAL RINSE 15 ML: 1.2 LIQUID ORAL at 21:30

## 2020-01-01 RX ADMIN — DEXTROSE MONOHYDRATE 25 ML: 25 INJECTION, SOLUTION INTRAVENOUS at 15:00

## 2020-01-01 RX ADMIN — MIDODRINE HYDROCHLORIDE 15 MG: 5 TABLET ORAL at 08:37

## 2020-01-01 RX ADMIN — FENTANYL CITRATE 100 MCG: 50 INJECTION, SOLUTION INTRAMUSCULAR; INTRAVENOUS at 21:00

## 2020-01-01 RX ADMIN — MIDODRINE HYDROCHLORIDE 5 MG: 5 TABLET ORAL at 11:13

## 2020-01-01 RX ADMIN — CALCIUM CHLORIDE 1 G: 100 INJECTION PARENTERAL at 14:47

## 2020-01-01 RX ADMIN — CHLORHEXIDINE GLUCONATE 0.12% ORAL RINSE 15 ML: 1.2 LIQUID ORAL at 11:25

## 2020-01-01 RX ADMIN — INSULIN HUMAN 10 UNITS: 100 INJECTION, SOLUTION PARENTERAL at 16:06

## 2020-01-01 RX ADMIN — Medication 20000 ML: at 08:47

## 2020-01-01 RX ADMIN — MIDODRINE HYDROCHLORIDE 10 MG: 5 TABLET ORAL at 08:35

## 2020-01-01 RX ADMIN — Medication 20 MG: at 08:35

## 2020-01-01 RX ADMIN — ACETAMINOPHEN 975 MG: 650 SUSPENSION ORAL at 15:03

## 2020-01-01 RX ADMIN — MIDAZOLAM 2 MG: 1 INJECTION INTRAMUSCULAR; INTRAVENOUS at 22:00

## 2020-01-01 RX ADMIN — ACETAMINOPHEN 975 MG: 650 SUSPENSION ORAL at 06:10

## 2020-01-01 RX ADMIN — SODIUM BICARBONATE 50 MEQ: 84 INJECTION, SOLUTION INTRAVENOUS at 07:45

## 2020-01-01 RX ADMIN — EPINEPHRINE 1 MG: 0.1 INJECTION, SOLUTION ENDOTRACHEAL; INTRACARDIAC; INTRAVENOUS at 14:44

## 2020-01-01 RX ADMIN — SODIUM BICARBONATE 50 MEQ: 84 INJECTION, SOLUTION INTRAVENOUS at 07:40

## 2020-01-01 RX ADMIN — CALCIUM GLUCONATE 1 G: 20 INJECTION, SOLUTION INTRAVENOUS at 00:39

## 2020-01-01 RX ADMIN — Medication 20000 ML: at 21:04

## 2020-01-01 RX ADMIN — HYDROMORPHONE HYDROCHLORIDE 1 MG: 1 INJECTION, SOLUTION INTRAMUSCULAR; INTRAVENOUS; SUBCUTANEOUS at 14:36

## 2020-01-01 RX ADMIN — CHLORHEXIDINE GLUCONATE 0.12% ORAL RINSE 15 ML: 1.2 LIQUID ORAL at 08:37

## 2020-01-01 RX ADMIN — MIDAZOLAM HYDROCHLORIDE 4 MG: 2 INJECTION, SOLUTION INTRAMUSCULAR; INTRAVENOUS at 09:52

## 2020-01-01 RX ADMIN — CHLORHEXIDINE GLUCONATE 0.12% ORAL RINSE 15 ML: 1.2 LIQUID ORAL at 20:00

## 2020-01-01 RX ADMIN — CALCIUM CHLORIDE 1 G: 100 INJECTION PARENTERAL at 07:43

## 2020-01-01 RX ADMIN — Medication 20000 ML: at 14:00

## 2020-01-01 RX ADMIN — PROPOFOL 20 MCG/KG/MIN: 10 INJECTION, EMULSION INTRAVENOUS at 20:43

## 2020-01-01 RX ADMIN — DEXTROSE MONOHYDRATE 25 ML: 25 INJECTION, SOLUTION INTRAVENOUS at 14:56

## 2020-01-01 RX ADMIN — HEPARIN SODIUM 5000 UNITS: 5000 INJECTION INTRAVENOUS; SUBCUTANEOUS at 05:58

## 2020-01-01 RX ADMIN — HYDROMORPHONE HYDROCHLORIDE 1 MG: 1 INJECTION, SOLUTION INTRAMUSCULAR; INTRAVENOUS; SUBCUTANEOUS at 14:42

## 2020-01-01 RX ADMIN — SODIUM CHLORIDE 4 UNITS/HR: 9 INJECTION, SOLUTION INTRAVENOUS at 22:44

## 2020-01-01 RX ADMIN — CALCIUM GLUCONATE 1 G: 20 INJECTION, SOLUTION INTRAVENOUS at 01:08

## 2020-01-01 RX ADMIN — Medication 20000 ML: at 04:52

## 2020-01-01 RX ADMIN — CHLORHEXIDINE GLUCONATE 0.12% ORAL RINSE 15 ML: 1.2 LIQUID ORAL at 21:28

## 2020-01-01 RX ADMIN — CALCIUM GLUCONATE 1 G: 20 INJECTION, SOLUTION INTRAVENOUS at 06:25

## 2020-01-01 RX ADMIN — PROPOFOL INJECTABLE EMULSION 10 MCG/KG/MIN: 10 INJECTION, EMULSION INTRAVENOUS at 22:02

## 2020-01-01 RX ADMIN — MAGNESIUM SULFATE HEPTAHYDRATE 1 G: 1 INJECTION, SOLUTION INTRAVENOUS at 05:51

## 2020-01-01 RX ADMIN — Medication 20 MG: at 08:02

## 2020-01-01 RX ADMIN — CALCIUM GLUCONATE 1 G: 20 INJECTION, SOLUTION INTRAVENOUS at 12:56

## 2020-01-01 RX ADMIN — MIDODRINE HYDROCHLORIDE 10 MG: 5 TABLET ORAL at 15:28

## 2020-01-01 RX ADMIN — ACETAMINOPHEN 975 MG: 650 SUSPENSION ORAL at 20:07

## 2020-01-01 RX ADMIN — ACETAMINOPHEN 975 MG: 650 SUSPENSION ORAL at 12:31

## 2020-01-01 RX ADMIN — FENTANYL CITRATE 100 MCG: 50 INJECTION, SOLUTION INTRAMUSCULAR; INTRAVENOUS at 09:51

## 2020-01-01 RX ADMIN — ARGATROBAN 0.2 MCG/KG/MIN: 50 INJECTION, SOLUTION INTRAVENOUS at 15:18

## 2020-01-01 RX ADMIN — CHLORHEXIDINE GLUCONATE 0.12% ORAL RINSE 15 ML: 1.2 LIQUID ORAL at 08:02

## 2020-01-01 RX ADMIN — MIDAZOLAM HYDROCHLORIDE 2 MG: 2 INJECTION, SOLUTION INTRAMUSCULAR; INTRAVENOUS at 22:00

## 2020-01-01 RX ADMIN — Medication 20000 ML: at 19:56

## 2020-01-01 RX ADMIN — SODIUM BICARBONATE 100 ML/HR: 84 INJECTION, SOLUTION INTRAVENOUS at 09:27

## 2020-01-01 RX ADMIN — LEVETIRACETAM 500 MG: 100 INJECTION, SOLUTION INTRAVENOUS at 11:39

## 2020-01-01 RX ADMIN — HEPARIN SODIUM 5000 UNITS: 5000 INJECTION INTRAVENOUS; SUBCUTANEOUS at 16:10

## 2020-01-01 RX ADMIN — SODIUM CHLORIDE 6 MCG/MIN: 0.9 INJECTION, SOLUTION INTRAVENOUS at 17:28

## 2020-01-01 RX ADMIN — ATORVASTATIN CALCIUM 40 MG: 40 TABLET, FILM COATED ORAL at 17:10

## 2020-01-01 RX ADMIN — ACETAMINOPHEN 975 MG: 650 SUSPENSION ORAL at 12:57

## 2020-06-29 PROBLEM — I63.9 CEREBROVASCULAR ACCIDENT (CVA) (HCC): Status: ACTIVE | Noted: 2019-03-20

## 2020-06-29 PROBLEM — N25.81 HYPERPARATHYROIDISM DUE TO ESRD ON DIALYSIS (HCC): Status: ACTIVE | Noted: 2019-03-20

## 2020-06-29 PROBLEM — Z86.73 HISTORY OF CVA IN ADULTHOOD: Status: ACTIVE | Noted: 2019-03-20

## 2020-06-29 PROBLEM — Z99.2 END STAGE RENAL DISEASE ON DIALYSIS (HCC): Status: ACTIVE | Noted: 2019-03-20

## 2020-06-29 PROBLEM — J96.11 CHRONIC RESPIRATORY FAILURE WITH HYPOXIA (HCC): Status: ACTIVE | Noted: 2020-01-01

## 2020-06-29 PROBLEM — Z72.0 TOBACCO USER: Status: ACTIVE | Noted: 2019-03-20

## 2020-06-29 PROBLEM — I10 ESSENTIAL HYPERTENSION: Status: ACTIVE | Noted: 2019-03-20

## 2020-06-29 PROBLEM — B19.20 HEPATITIS C: Status: ACTIVE | Noted: 2019-03-20

## 2020-06-29 PROBLEM — N18.6 END STAGE RENAL DISEASE ON DIALYSIS (HCC): Status: ACTIVE | Noted: 2019-03-20

## 2020-06-29 PROBLEM — R77.8 ELEVATED TROPONIN: Status: ACTIVE | Noted: 2020-01-01

## 2020-06-29 PROBLEM — R73.9 HYPERGLYCEMIA: Status: ACTIVE | Noted: 2020-01-01

## 2020-06-29 PROBLEM — Z99.2 HYPERPARATHYROIDISM DUE TO ESRD ON DIALYSIS (HCC): Status: ACTIVE | Noted: 2019-03-20

## 2020-06-29 PROBLEM — E08.65 DIABETES MELLITUS DUE TO UNDERLYING CONDITION WITH HYPERGLYCEMIA, WITHOUT LONG-TERM CURRENT USE OF INSULIN (HCC): Status: ACTIVE | Noted: 2020-01-01

## 2020-06-29 PROBLEM — E78.5 HLD (HYPERLIPIDEMIA): Status: ACTIVE | Noted: 2020-01-01

## 2020-06-29 PROBLEM — N18.6 HYPERPARATHYROIDISM DUE TO ESRD ON DIALYSIS (HCC): Status: ACTIVE | Noted: 2019-03-20

## 2020-06-29 PROBLEM — I46.9 CARDIAC ARREST (HCC): Status: ACTIVE | Noted: 2020-01-01

## 2020-06-29 PROBLEM — I35.0 NONRHEUMATIC AORTIC VALVE STENOSIS: Status: ACTIVE | Noted: 2019-03-20

## 2020-06-29 PROBLEM — G93.1 ANOXIC BRAIN INJURY (HCC): Status: ACTIVE | Noted: 2020-01-01

## 2020-06-29 PROBLEM — M41.9 KYPHOSCOLIOSIS: Status: ACTIVE | Noted: 2019-03-20

## 2020-06-29 PROBLEM — I34.2 NON-RHEUMATIC MITRAL VALVE STENOSIS: Status: ACTIVE | Noted: 2019-03-20

## 2020-06-29 NOTE — ASSESSMENT & PLAN NOTE
Likely elevated 2/2 cardiac arrest  Arrest does not appear to be cardiogenic in nature  Echo pending  Follow troponin

## 2020-06-29 NOTE — ED PROVIDER NOTES
History  Chief Complaint   Patient presents with    Cardiac Arrest     per ALS, patient collapsed this morning in front of daughter  CPR in progress on arrival   per S call to 911 @ 0700  Patient arrives via EMS with CPR in progress  As per EMS call went out around 7 am patient collapsed at home  Reports PEA arrest  7 EPI/BiCARB/CaCL  LMA in place with Hillary Rachel device for CPR  History provided by:  EMS personnel and medical records  History limited by:  Patient unresponsive  Cardiac Arrest       Prior to Admission Medications   Prescriptions Last Dose Informant Patient Reported? Taking?   atorvastatin (LIPITOR) 40 mg tablet   Yes No   Sig: Take 40 mg by mouth daily   calcium acetate (PHOSLO) 667 mg capsule   Yes No   Sig: Take by mouth 3 (three) times a day with meals   midodrine (PROAMATINE) 10 MG tablet   Yes No   Sig: Take 10 mg by mouth 3 (three) times a day      Facility-Administered Medications: None       Past Medical History:   Diagnosis Date    [facility] [address] [facility]     Diabetes mellitus [facility] [address] [facility]     Hypertension     Renal disorder     Stroke [facility])        Past Surgical History:   Procedure Laterality Date    ADENOIDECTOMY      DIALYSIS FISTULA CREATION Right        History reviewed  No pertinent family history  I have reviewed and agree with the history as documented  E-Cigarette/Vaping    E-Cigarette Use Never User      E-Cigarette/Vaping Substances     Social History     Tobacco Use    Smoking status: Current Every Day Smoker     Packs/day: 0 25    Smokeless tobacco: Never Used   Substance Use Topics    Alcohol use: Yes     Frequency: Monthly or less     Drinks per session: 1 or 2     Binge frequency: Never    Drug use: Never       Review of Systems   Unable to perform ROS: Patient unresponsive       Physical Exam  Physical Exam   Constitutional: She appears distressed  HENT:   Head: Atraumatic     Eyes:   Pupils round 3 mm not reactive   Neck:   EJ left side of neck   Cardiovascular: No heart beat or pulse   Pulmonary/Chest:   Bilateral BS with rales and decreased bases bilaterally, LMA in place   Abdominal: Soft  She exhibits no distension  Musculoskeletal: She exhibits no edema  Neurological:   unresponsive   Skin: Capillary refill takes more than 3 seconds  Nursing note and vitals reviewed        Vital Signs  ED Triage Vitals   Temperature Pulse Respirations Blood Pressure SpO2   06/29/20 0751 06/29/20 0751 06/29/20 0804 06/29/20 0751 06/29/20 0751   (!) 92 6 °F (33 7 °C) (!) 130 18 137/77 98 %      Temp Source Heart Rate Source Patient Position - Orthostatic VS BP Location FiO2 (%)   06/29/20 0751 06/29/20 0751 06/29/20 0758 06/29/20 0900 06/29/20 0845   Tympanic Monitor Lying Right leg 100      Pain Score       --                  Vitals:    06/29/20 0845 06/29/20 0900 06/29/20 0915 06/29/20 0930   BP: 125/58 123/60 115/54 118/57   Pulse: 96 84 82 82   Patient Position - Orthostatic VS: Lying Lying Lying Lying         Visual Acuity      ED Medications  Medications   norepinephrine (LEVOPHED) 4 mg (STANDARD CONCENTRATION) IV in sodium chloride 0 9% 250 mL (3 mcg/min Intravenous Rate/Dose Change 6/29/20 0905)   sodium bicarbonate 150 mEq in dextrose 5 % 1,000 mL infusion (100 mL/hr Intravenous New Bag 6/29/20 0927)   sodium bicarbonate 50 mEq/50 mL injection (50 mEq Intravenous Given 6/29/20 0740)   calcium chloride 1 g/10 mL injection (1 g Intravenous Given 6/29/20 0743)   EPINEPHrine (ADRENALIN) injection SOSY (1 mg Intravenous Given 6/29/20 0743)   sodium bicarbonate 50 mEq/50 mL injection (50 mEq Intravenous Given 6/29/20 0745)       Diagnostic Studies  Results Reviewed     Procedure Component Value Units Date/Time    Lactic acid, plasma [[id number]]  (Abnormal) Collected:  06/29/20 0751    Lab Status:  Final result Specimen:  Blood from Line, Venous Updated:  06/29/20 0901     LACTIC ACID 12 4 mmol/L     Narrative:       Result may be elevated if tourniquet was used during collection  Lactic acid 2 Hours [[id number]]     Lab Status:  No result Specimen:  Blood     Comprehensive metabolic panel [[id number]]  (Abnormal) Collected:  06/29/20 0751    Lab Status:  Final result Specimen:  Blood from Line, Venous Updated:  06/29/20 0900     Sodium 157 mmol/L      Potassium 4 4 mmol/L      Chloride 112 mmol/L      CO2 32 mmol/L      ANION GAP 13 mmol/L      BUN 90 mg/dL      Creatinine 10 41 mg/dL      Glucose 318 mg/dL      Calcium 14 3 mg/dL      AST 57 U/L      ALT 52 U/L      Alkaline Phosphatase 197 U/L      Total Protein 5 7 g/dL      Albumin 2 9 g/dL      Total Bilirubin 0 30 mg/dL      eGFR 4 ml/min/1 73sq m     Narrative:       [place] guidelines for Chronic Kidney Disease (CKD):     Stage 1 with normal or high GFR (GFR > 90 mL/min/1 73 square meters)    Stage 2 Mild CKD (GFR = 60-89 mL/min/1 73 square meters)    Stage 3A Moderate CKD (GFR = 45-59 mL/min/1 73 square meters)    Stage 3B Moderate CKD (GFR = 30-44 mL/min/1 73 square meters)    Stage 4 Severe CKD (GFR = 15-29 mL/min/1 73 square meters)    Stage 5 End Stage CKD (GFR <15 mL/min/1 73 square meters)  Note: GFR calculation is accurate only with a steady state creatinine    Novel Coronavirus [facility] [[id number]] Collected:  06/29/20 0855    Lab Status:   In process Specimen:  Nares from Nose Updated:  06/29/20 0858    Magnesium [[id number]]  (Abnormal) Collected:  06/29/20 0751    Lab Status:  Final result Specimen:  Blood from Line, Venous Updated:  06/29/20 0855     Magnesium 2 9 mg/dL     Phosphorus [[id number]]  (Abnormal) Collected:  06/29/20 0751    Lab Status:  Final result Specimen:  Blood from Line, Venous Updated:  06/29/20 0855     Phosphorus 8 9 mg/dL     NT-BNP PRO [[id number]]  (Abnormal) Collected:  06/29/20 0751    Lab Status:  Final result Specimen:  Blood from Line, Venous Updated:  06/29/20 0855     NT-proBNP 17,892 pg/mL     Blood gas, arterial [[id number]] (Abnormal) Collected:  06/29/20 0844    Lab Status:  Final result Specimen:  Blood, Arterial from Brachial, Left Updated:  06/29/20 0852     pH, Arterial 7 218     PH ART TC 7 263     pCO2, Arterial 40 6 mm Hg      PCO2 (TC) Arterial 35 1 mm Hg      pO2, Arterial 366 0 mm Hg      PO2 (TC) Arterial 348 9 mm Hg      HCO3, Arterial 16 2 mmol/L      Base Excess, Arterial -10 9 mmol/L      O2 Content, Arterial 17 3 mL/dL      O2 HGB,Arterial  99 3 %      SOURCE Brachial, Left     KIRK TEST Yes     Temperature 92 6 Degrees Fehrenheit      Vent Type- AC AC     AC Rate 18     Tidal Volume 350 ml      Inspired Air (FIO2) 100     PEEP 5    Troponin I [[id number]]  (Normal) Collected:  06/29/20 0751    Lab Status:  Final result Specimen:  Blood from Line, Venous Updated:  06/29/20 0829     Troponin I 0 03 ng/mL     CBC and differential [[id number]]  (Abnormal) Collected:  06/29/20 0751    Lab Status:  Final result Specimen:  Blood from Line, Venous Updated:  06/29/20 0817     WBC 7 03 Thousand/uL      RBC 3 57 Million/uL      Hemoglobin 10 3 g/dL      Hematocrit 34 8 %      MCV 98 fL      MCH 28 9 pg      MCHC 29 6 g/dL      RDW 16 3 %      MPV 12 0 fL      Platelets 765 Thousands/uL      nRBC 1 /100 WBCs     Narrative: This is an appended report  These results have been appended to a previously verified report  Fingerstick Glucose (POCT) [[id number]]  (Abnormal) Collected:  06/29/20 0756    Lab Status:  Final result Updated:  06/29/20 0757     POC Glucose 233 mg/dl                  CT head without contrast   Final Result by Jono Prado MD (06/29 1416)      Apparent lucency within the right occipital lobe  Streak artifact from scalp metallic density limits evaluation of this region and it is unclear whether findings are artifactual in nature versus secondary to ischemia  Short-term interval follow-up    and/or correlation with MRI is recommended for further evaluation        Chronic infarcts are noted within the left frontal lobe as well as right gangliocapsular region  Scattered chronic microvascular ischemic change  I personally discussed this study with Merari Nova on 6/29/2020 at 9:24 AM                   Workstation performed: [id number]         CT cervical spine without contrast   Final Result by Jann Corley MD (06/29 3591)      No acute fracture or evidence for traumatic malalignment  Diffusely heterogeneous appearance of the bones is most compatible with renal osteodystrophy  Multilevel chronic appearing loss of height of cervical vertebral bodies is noted most notable at C6  Workstation performed: [id number]         CT chest abdomen pelvis wo contrast   Final Result by Cassi Wong MD (06/29 1017)      Bilateral consolidations without groundglass opacity  In the setting of clinically suspected/proven COVID-19, the above lung parenchymal findings on CT indicate low confidence level for COVID-19  Endotracheal tube at the origin of the right mainstem bronchus  Severe T6 compression fracture resulting in an 80% kyphosis  The age is indeterminate but is not described on a recent chest x-ray report from [facility]  [facility]       Questionable posterior displaced midsternal fracture corresponding to motion artifact  Diffuse osteopenia of the spine with sclerosis of the endplates suggesting renal osteodystrophy  Bilateral renal atrophy  Tiny amount of free fluid adjacent to the tip of the liver      Incidental 24 x 20 mm calcified left thyroid nodule identified on this CT  According to guidelines published in the February 2015 white paper on incidental thyroid nodules in the Journal of the [employer] of Radiology [facility]), further evaluation    with ultrasound may be considered    However, as the majority of incidental thyroid nodules are benign and because small incidental thyroid malignancies typically demonstrate indolent behavior, consideration of the patient's life expectancy and possible    comorbidities should be taken into account prior to a decision to pursue further imaging  The nodule is described in a CT chest report from [facility] dated February 19, 2017  Unfortunately, measurements are not included  Comparison with this study would help determine stability  The study was marked in [facility] for immediate notification              Workstation performed: [id number]                    Procedures  CriticalCare Time  Performed by: Shy Walter DO  Authorized by: Shy Walter DO     Critical care provider statement:     Critical care time (minutes):  60    Critical care was necessary to treat or prevent imminent or life-threatening deterioration of the following conditions:  Respiratory failure, cardiac failure and metabolic crisis    Critical care was time spent personally by me on the following activities:  Blood draw for specimens, obtaining history from patient or surrogate, development of treatment plan with patient or surrogate, discussions with consultants, evaluation of patient's response to treatment, examination of patient, ventilator management, review of old charts, re-evaluation of patient's condition, ordering and review of radiographic studies, ordering and review of laboratory studies, ordering and performing treatments and interventions and interpretation of cardiac output measurements  Intubation  Date/Time: 6/29/2020 9:45 AM  Performed by: Shy Walter DO  Authorized by: Shy Walter DO     Patient location:  ED  Consent:     Consent obtained:  Emergent situation  Universal protocol:     Patient identity confirmed:  Arm band  Pre-procedure details:     Patient status:  Unresponsive    Pretreatment medications:  None    Paralytics:  None  Indications:     Indications for intubation: respiratory failure and airway protection    Procedure details:     Preoxygenation:  DAVID/LMA CPR in progress: no      Intubation method:  Oral    Oral intubation technique:  Glidescope    Laryngoscope blade: Mac 3    Tube size (mm):  7 0    Tube type:  Hi-lo    Number of attempts:  1    Cricoid pressure: no      Tube visualized through cords: yes    Placement assessment:     ETT to lip:  21    Tube secured with:  ETT martinez    Breath sounds:  Equal and absent over the epigastrium    Placement verification: chest rise, condensation, CXR verification, direct visualization, equal breath sounds, ETCO2 detector and tube exhalation      CXR findings:  ETT in right main stem    Tube repositioned: yes    Post-procedure details:     Patient tolerance of procedure: Tolerated well, no immediate complications             ED Course                                             MDM  Number of Diagnoses or Management Options  Cardiopulmonary arrest [facility]):   Diagnosis management comments: Pulse ox 100% on RA indicating adequate oxygenation    Patient arrived with CPR in progress  Given the history of ESRD and missed HD  Additional Bicarbonate and Calcium Chloride in addition to Epi given  Initial rhythm appeared sinusoidal, After addition Calcium and Bicarbonate achieved ROSC  Levophed was added after ROSC to maintain BP and patient was intubated to secure the airway  Bicarbonate drip started as well in addition to fluids  Sent to CT  ICU contacted and came to examine the patient  Patient did not require any sedation in the ER  Spoke with daughter on the phone and she arrived in the ER and was at bedside when ICU team arrived         Amount and/or Complexity of Data Reviewed  Clinical lab tests: ordered and reviewed  Tests in the radiology section of CPT®: ordered and reviewed  Discussion of test results with the performing providers: yes  Decide to obtain previous medical records or to obtain history from someone other than the patient: yes  Obtain history from someone other than the patient: yes  Review and summarize past medical records: yes  Discuss the patient with other providers: yes          Disposition  Final diagnoses:   Cardiopulmonary arrest [facility])     Time reflects when diagnosis was documented in both MDM as applicable and the Disposition within this note     Time User Action Codes Description Comment    6/29/2020  9:33 AM Colleen Verma Add [I46 9] Cardiopulmonary arrest [facility])       ED Disposition     ED Disposition Condition Date/Time Comment    Admit Stable Mon Jun 29, 2020  9:33 AM Case was discussed with Dr Abagail Bamberger and the patient's admission status was agreed to be Admission Status: inpatient status to the service of Dr Abagail Bamberger  Follow-up Information    None         Patient's Medications   Discharge Prescriptions    No medications on file     No discharge procedures on file      PDMP Review     None          ED Provider  Electronically Signed by           Annamaria Primrose, DO  06/30/20 4431

## 2020-06-29 NOTE — RESPIRATORY THERAPY NOTE
RT Protocol Note  Lino Lockett 75 y o  female MRN: [id number]  Unit/Bed#: ICU 01 Encounter: [id number]    Assessment    Principal Problem:    Cardiac arrest [facility])  Active Problems:    End stage renal disease on dialysis [facility])    Essential hypertension    Hepatitis C    Hyperparathyroidism due to ESRD on dialysis ([place] [address] )    Kyphoscoliosis    HLD (hyperlipidemia)    Nonrheumatic aortic valve stenosis    Non-rheumatic mitral valve stenosis    Tobacco user    Anoxic brain injury ([place] [address] )    Chronic respiratory failure with hypoxia ([place] [address] )      Home Pulmonary Medications: none  Home Devices/Therapy: Other (Comment)(none)    Past Medical History:   Diagnosis Date    Cerebrovascular accident (CVA) ([place] [address] ) 3/20/2019    Dementia ([place] [address] )     Diabetes mellitus ([place] [address] )     Hypertension     Renal disorder     Stroke ([place] [address] )      Social History     Socioeconomic History    Marital status:      Spouse name: None    Number of children: None    Years of education: None    Highest education level: None   Occupational History    None   Social Needs    Financial resource strain: None    Food insecurity:     Worry: None     Inability: None    Transportation needs:     Medical: None     Non-medical: None   Tobacco Use    Smoking status: Current Every Day Smoker     Packs/day: 0 25    Smokeless tobacco: Never Used   Substance and Sexual Activity    Alcohol use: Yes     Frequency: Monthly or less     Drinks per session: 1 or 2     Binge frequency: Never    Drug use: Never    Sexual activity: None   Lifestyle    Physical activity:     Days per week: None     Minutes per session: None    Stress: None   Relationships    Social connections:     Talks on phone: None     Gets together: None     Attends Che[employer] service: None     Active member of club or organization: None     Attends meetings of clubs or organizations: None     Relationship status: None    Intimate partner violence:     Fear of current or ex partner: None Emotionally abused: None     Physically abused: None     Forced sexual activity: None   Other Topics Concern    None   Social History Narrative    None       Subjective         Objective    Physical Exam:   Assessment Type: Assess only  General Appearance: Sedated  Respiratory Pattern: Normal  Chest Assessment: Chest expansion symmetrical  Bilateral Breath Sounds: Clear  Location Specific: No  Cough: None  O2 Device: vent    Vitals:  Blood pressure 150/69, pulse (!) 109, temperature (!) 95 2 °F (35 1 °C), resp  rate (!) 48, SpO2 (!) 30 %      Results from last 7 days   Lab Units 06/29/20  0844   PH ART  7 218*   PCO2 ART mm Hg 40 6   PO2 ART mm Hg 366 0*   HCO3 ART mmol/L 16 2*   BASE EXC ART mmol/L -10 9   O2 CONTENT ART mL/dL 17 3   O2 HGB, ARTERIAL % 99 3*   ABG SOURCE  Brachial, Left   KIRK TEST  Yes       Imaging and other studies:     O2 Device: vent     Plan    Respiratory Plan: No distress/Pulmonary history        Resp Comments: pt in no distress

## 2020-06-29 NOTE — ASSESSMENT & PLAN NOTE
Previous CVA in 2009  Unclear etiology - daughter states possible aneurysm  Recalls history of possible Vianne Jeremy hole  Per daughter- no residual deficits

## 2020-06-29 NOTE — RESPIRATORY THERAPY NOTE
Pt  Recd from the er place on vent  vent setting as per flow sheet all alarms  on and functional vent working properly

## 2020-06-29 NOTE — EMTALA/ACUTE CARE TRANSFER
[facility] INTENSIVE CARE  UNIT  [address]  [place] [address]  Dept: [phone]      ACUTE CARE TRANSFER CONSENT    NAME Luan Art                                         1955                              MRN [id number]    I have been informed of my rights regarding examination, treatment, and transfer   by Dr Moses Montague MD    Benefits: Specialized equipment and/or services available at the receiving facility EMU    Risks: Potential for delay in receiving treatment, Potential deterioration of medical condition      Transfer Request:  I acknowledge that my medical condition has been evaluated and explained to me by the treating physician or other qualified medical person and/or my attending physician who has recommended and offered to me further medical examination and treatment  I understand the Hospital's obligation with respect to the treatment and stabilization of my medical condition  I nevertheless request to be transferred  I release the Hospital, the doctor, and any other persons caring for me from all responsibility or liability for any injury or ill effects that may result from my transfer and agree to accept all responsibility for the consequences of my choice to transfer, rather than receive stabilizing treatment at the Hospital  I understand that because the transfer is my request, my insurance may not provide reimbursement for the services  The Hospital will assist and direct me and my family in how to make arrangements for transfer, but the hospital is not liable for any fees charged by the transport service  In spite of this understanding, I refuse to consent to further medical examination and treatment which has been offered to me, and request transfer to  Harry Dickson Name, [facility] & State : [facility]   I authorize the performance of emergency medical procedures and treatments upon me in both transit and upon arrival at the receiving facility  Additionally, I authorize the release of any and all medical records to the receiving facility and request they be transported with me, if possible  I authorize the performance of emergency medical procedures and treatments upon me in both transit and upon arrival at the receiving facility  Additionally, I authorize the release of any and all medical records to the receiving facility and request they be transported with me, if possible  I understand that the safest mode of transportation during a medical emergency is an ambulance and that the Hospital advocates the use of this mode of transport  Risks of traveling to the receiving facility by car, including absence of medical control, life sustaining equipment, such as oxygen, and medical personnel has been explained to me and I fully understand them  (GUY CORRECT BOX BELOW)  [  ]  I consent to the stated transfer and to be transported by ambulance/helicopter  [  ]  I consent to the stated transfer, but refuse transportation by ambulance and accept full responsibility for my transportation by car  I understand the risks of non-ambulance transfers and I exonerate the Hospital and its staff from any deterioration in my condition that results from this refusal     X___________________________________________    DATE  20  TIME________  Signature of patient or legally responsible individual signing on patient behalf           RELATIONSHIP TO PATIENT_________________________          Provider Certification    NAME Tova George                                        [facility] 1955                              MRN [id number]    A medical screening exam was performed on the above named patient    Based on the examination:    Condition Necessitating Transfer Need for EMU    Patient Condition: The patient has been stabilized such that within reasonable medical probability, no material deterioration of the patient condition or the condition of the unborn child(alyssa) is likely to result from the transfer    Reason for Transfer: Level of Care needed not available at this facility(EMU)    Transfer Requirements: [address]   · Space available and qualified personnel available for treatment as acknowledged by    · Agreed to accept transfer and to provide appropriate medical treatment as acknowledged by       Ramses  · Appropriate medical records of the examination and treatment of the patient are provided at the time of transfer   [address], [address] _______  · Transfer will be performed by qualified personnel from    and appropriate transfer equipment as required, including the use of necessary and appropriate life support measures  Provider Certification: I have examined the patient and explained the following risks and benefits of being transferred/refusing transfer to the patient/family:  General risk, such as traffic hazards, adverse weather conditions, rough terrain or turbulence, possible failure of equipment (including vehicle or aircraft), or consequences of actions of persons outside the control of the transport personnel, Unanticipated needs of medical equipment and personnel during transport, Risk of worsening condition      Based on these reasonable risks and benefits to the patient and/or the unborn child(alyssa), and based upon the information available at the time of the patients examination, I certify that the medical benefits reasonably to be expected from the provision of appropriate medical treatments at another medical facility outweigh the increasing risks, if any, to the individuals medical condition, and in the case of labor to the unborn child, from effecting the transfer      X____________________________________________ DATE 06/29/20        TIME_______      ORIGINAL - SEND TO MEDICAL RECORDS   COPY - SEND WITH PATIENT DURING TRANSFER

## 2020-06-29 NOTE — ASSESSMENT & PLAN NOTE
Pt on T Th Sat schedule - recently moved into area without HD chair - was to start HD locally tomorrow   Has presented to the ED for evaluation for HD  Last HD was Wednesday  Pt significantly volume overloaded at this time   Nephrology consult - appreciate input  CRRT 2/2 hemodynamic instability - goal -50cc/hr - if tolerated increase to -100cc/hr

## 2020-06-29 NOTE — ED NOTES
Attempt lopez and OG tube placement by two separate RN's - unsuccessful  Dr Renetta Barrett made aware       Shivani Jimenez RN  06/29/20 [id number]

## 2020-06-29 NOTE — ASSESSMENT & PLAN NOTE
Likely anoxic brain injury  Pt with poor neuro status (GCS 3) and myoclonic jerking vs seizure activity   Does over breathe ventilator -   Keppra load and continue AEDs  Will require transfer to [place] for EMU vs EEG  CT head without acute evidence of brain injury - MRI vs repeat CT for further evaluation  Neurology consult - appreciate input

## 2020-06-29 NOTE — RESPIRATORY THERAPY NOTE
RT Ventilator Management Note  Luan Art 75 y o  female MRN: [id number]  Unit/Bed#: GERI Encounter: [id number]      Daily Screen     No data found in the last 10 encounters  Pt received CPR in progess BVM used to ventilate during CPR  100%  MD removed LMA replaced wiith #7 0 ET tube  Tied at 20 at the lip   later moved to 19 at the lip   Pt transported to CT scan with Para pac  Placed on  in ED teransported to ICU with Donna Colby to room 1 placed on vent    Physical Exam:

## 2020-06-29 NOTE — ASSESSMENT & PLAN NOTE
Likely anoxic brain injury  Pt with poor neuro status (GCS 3) and myoclonic jerking vs seizure activity   Does over breathe ventilator -   Keppra load and continue AEDs  Will require transfer to [place] for EMU vs EEG  CT head without acute evidence of brain injury - MRI vs repeat CT for further evaluation  Neurology consult - appreciate input

## 2020-06-29 NOTE — ASSESSMENT & PLAN NOTE
Pt with history of DM  Per daughter, was diet controlled  Pt profoundly hyperglycemic - initiate insulin gtt  Goal blood sugar <180  Hgb A1C pending

## 2020-06-29 NOTE — DISCHARGE SUMMARY
Discharge Summary - Lino Lockett 75 y o  female MRN: [id number]    Unit/Bed#: ICU 01 Encounter: [id number]    Admission Date:   Admission Orders (From admission, onward)     Ordered        06/29/20 0934  Inpatient Admission (expected length of stay for this patient Order details is greater than two midnights)  Once                     Admitting Diagnosis: Cardiac arrest [facility]) [I46 9]  Cardiopulmonary arrest ([facility] [address] ) [I46 9]  End stage renal disease on dialysis ([facility] [address] ) [N18 6, Z99 2]    HPI:  Lino Lockett is a 75 y o  female  With PMH of severe AS- declined for TAVR, previous cva, O2 dependence (2lnc), ESRD since 2009, recently hospitalized at [facility] for SOB and decompensated heart failure, now recently moved into [facility] from [facility] without nephrology follow up or arranged HD chair  Pt has presented twice to the ED at [facility] for evaluation for HD - last HD was last Wednesday  This am, pt was acutely SOB  And had witnessed collapse in front of her daughter  On arrival EMS found pt to be in PEA arrest- CPR was initiated  Approximately 45 minutes estimated downtime before ROSC was achieved  Pt was brought to the ICU for emergent CRRT and post cardiac arrest management  Pts anatomy was not favorable for IJ HD catheter placement - a Temporary HD catheter was placed in the Right Groin under sterile technique  Pt exhibited seizure like activity vs myoclonus  Pt would stiffen upper body with upward gaze for 1-2 seconds intermittently  EEG and neuro consult was ordered  At approximately 1440 pt became bradycardic and pulseless after blood from CRRT was returned  Pt became bradycardic and pulseless  Pt achieved ROSC after 15 minutes after 2 rounds of epi and 2 amps of bicarb  EEG was unable to be performed at [facility] - it was decided to urgently transfer the patient to [facility] for EMU and evaulation of her seizures - as well as CRRT and mechanical ventilation support      * Cardiac arrest [facility])  Assessment & Plan  Pt with witnessed collapse after sudden onset of acute SOB  PT in PEA arrest on EMS arrival  Received 7 rounds of EPI, 1 bicarb and calcium on route  On arrival - received EPI and multiple amps of bicarb and achieved ROSC  Approximate down time 45 minutes  Likely etiology acidemia vs hypoxia vs electrolyte abnormalities  Pt continues on levophed - wean as tolerated  Emergent CRRT  Echocardiogram pending  Pt temp 92 [employer] - avoid fever - monitor neurologic status   CT head with chronic infarcts and possible lucency  Repeat CT head vs MRI for further eludication of possible anoxic brain injury        [facility] brain [facility] ([facility] )  Assessment & Plan  Likely anoxic brain injury  Pt with poor neuro status (GCS 3) and myoclonic jerking vs seizure activity   Does over breathe ventilator -   Keppra load and continue AEDs  Will require transfer to [place] for EMU vs EEG  CT head without acute evidence of brain injury - MRI vs repeat CT for further evaluation  Neurology consult - appreciate input      End stage renal disease on dialysis [facility])  Assessment & Plan  Pt on T Th Sat schedule - recently moved into area without HD chair - was to start HD locally tomorrow   Has presented to the ED for evaluation for HD  Last HD was Wednesday  Pt significantly volume overloaded at this time   Nephrology consult - appreciate input  CRRT 2/2 hemodynamic instability - goal -50cc/hr - if tolerated increase to -100cc/hr      Diabetes mellitus due to underlying condition with hyperglycemia, without long-term current use of insulin ([facility] )  Assessment & Plan  Pt with history of DM  Per daughter, was diet controlled  Pt profoundly hyperglycemic - initiate insulin gtt  Goal blood sugar <180  Hgb A1C pending      Non-rheumatic mitral valve stenosis  Assessment & Plan  Mild/moderate MS on previous echo    Nonrheumatic aortic valve stenosis  Assessment & Plan  Echo 4/12/2020 at [facility] with EF 78% and severe AS accompanied by mild AR  ALAN 0 7  Per daughter, pt was evaluated by cardiac surgery and refused as she was deemed too high risk for TAVR  Avoid hypotension and tachycardia    Chronic respiratory failure with hypoxia ([address] )  Assessment & Plan  Pt chronically on 2 LNC  Left oxygen tank at previous residence    HLD (hyperlipidemia)  Assessment & Plan  Continue statin    Hyperparathyroidism due to ESRD on dialysis [facility])  Assessment & Plan  Will defer management to nephrology    Hepatitis C  Assessment & Plan  Positive for hepatitis C  No evidence of decompensated liver failure at this time  Monitor LFT    Elevated troponin  Assessment & Plan  Likely elevated 2/2 cardiac arrest  Arrest does not appear to be cardiogenic in nature  Echo pending  Follow troponin    History of CVA in adulthood  Assessment & Plan  Previous CVA in 2009  Unclear etiology - daughter states possible aneurysm  Recalls history of possible Orien Salm hole  Per daughter- no residual deficits          Procedures Performed:   Orders Placed This Encounter   Procedures    Critical Care    Intubation           Resolved Problems  Date Reviewed: 6/29/2020    None          Condition at Discharge: critical         Discharge instructions/Information to patient and family:   See after visit summary for information provided to patient and family  Provisions for Follow-Up Care:  See after visit summary for information related to follow-up care and any pertinent home health orders  PCP: No primary care provider on file  Disposition: [facility] critical care              Discharge Statement   I spent >30 minutes discharging the patient  This time was spent on the day of discharge  I had direct contact with the patient on the day of discharge  Additional documentation is required if more than 30 minutes were spent on discharge  Discharge Medications:  See after visit summary for reconciled discharge medications provided to patient and family

## 2020-06-29 NOTE — ASSESSMENT & PLAN NOTE
Echo 4/12/2020 at [facility] with EF 78% and severe AS accompanied by mild AR  ALAN 0 7  Per daughter, pt was evaluated by cardiac surgery and refused as she was deemed too high risk for TAVR  Avoid hypotension and tachycardia

## 2020-06-29 NOTE — ASSESSMENT & PLAN NOTE
Previous CVA in 2009  Unclear etiology - daughter states possible aneurysm  Recalls history of possible Pilar Cortez hole  Per daughter- no residual deficits

## 2020-06-29 NOTE — ASSESSMENT & PLAN NOTE
Echo 4/12/2020 at [facility] with EF 78% and severe AS accompanied by mild AR  ALAN 0 7  Per daughter, pt was evaluated by cardiac surgery and refused as she was deemed too high risk for TAVR  Avoid hypotension and tachycardia

## 2020-06-29 NOTE — H&P
H&P- Yesi Baez 1955, 75 y o  female MRN: [id number]    Unit/Bed#: ICU 01 Encounter: [id number]    Primary Care Provider: No primary care provider on file     Date and time admitted to hospital: 6/29/2020  7:38 AM    Principal Problem:    Cardiac arrest [facility])  Active Problems:    Anoxic brain injury ([facility] )    End stage renal disease on dialysis [facility])    Nonrheumatic aortic valve stenosis    Non-rheumatic mitral valve stenosis    Diabetes mellitus due to underlying condition with hyperglycemia, without long-term current use of insulin (HCC)    Hepatitis C    Hyperparathyroidism due to ESRD on dialysis ([place] [address] )    HLD (hyperlipidemia)    Chronic respiratory failure with hypoxia ([facility])    History of CVA in adulthood    Kyphoscoliosis    Tobacco user        * Cardiac arrest [facility])  Assessment & Plan  Pt with witnessed collapse after sudden onset of acute SOB  PT in PEA arrest on EMS arrival  Received 7 rounds of EPI, 1 bicarb and calcium on route  On arrival - received EPI and multiple amps of bicarb and achieved ROSC  Approximate down time 45 minutes  Likely etiology acidemia vs hypoxia vs electrolyte abnormalities  Pt continues on levophed - wean as tolerated  Emergent CRRT  Echocardiogram pending  Pt temp 92 Farenheit - avoid fever - monitor neurologic status   CT head with chronic infarcts and possible lucency  Repeat CT head vs MRI for further eludication of possible anoxic brain injury        Anoxic brain injury ([place] [address] )  Assessment & Plan  Likely anoxic brain injury  Pt with poor neuro status (GCS 3) and myoclonic jerking vs seizure activity   Does over breathe ventilator -   Keppra load and continue AEDs  Will require transfer to [place] for EMU vs EEG  CT head without acute evidence of brain injury - MRI vs repeat CT for further evaluation  Neurology consult - appreciate input      End stage renal disease on dialysis [facility])  Assessment & Plan  Pt on T Th Sat schedule - recently moved into area without HD chair - was to start HD locally tomorrow   Has presented to the ED for evaluation for HD  Last HD was Wednesday  Pt significantly volume overloaded at this time   Nephrology consult - appreciate input  CRRT 2/2 hemodynamic instability - goal -50cc/hr - if tolerated increase to -100cc/hr      Diabetes mellitus due to underlying condition with hyperglycemia, without long-term current use of insulin ([address] )  Assessment & Plan  Pt with history of DM  Per daughter, was diet controlled  Pt profoundly hyperglycemic - initiate insulin gtt  Goal blood sugar <180  Hgb A1C pending      Non-rheumatic mitral valve stenosis  Assessment & Plan  Mild/moderate MS on previous echo    Nonrheumatic aortic valve stenosis  Assessment & Plan  Echo 4/12/2020 at [facility] with EF 78% and severe AS accompanied by mild AR  ALAN 0 7  Per daughter, pt was evaluated by cardiac surgery and refused as she was deemed too high risk for TAVR  Avoid hypotension and tachycardia    Chronic respiratory failure with hypoxia ([address] )  Assessment & Plan  Pt chronically on 2 LNC  Left oxygen tank at previous residence    HLD (hyperlipidemia)  Assessment & Plan  Continue statin    Hyperparathyroidism due to ESRD on dialysis [facility])  Assessment & Plan  Will defer management to nephrology    Hepatitis C  Assessment & Plan  Positive for hepatitis C  No evidence of decompensated liver failure at this time  Monitor LFT    Tobacco user  Assessment & Plan  Pt continues smoking - 1/4 ppd  100 year pack history      -------------------------------------------------------------------------------------------------------------  Chief Complaint: s/p cardiac arrest    History of Present Illness   HX and PE limited by: KEN Ac is a 75 y o  female  With PMH of severe AS- declined for TAVR, previous cva, O2 dependence (2lnc), ESRD since 2009, recently hospitalized at [facility] for SOB and decompensated heart failure, now recently moved into area from [place] without nephrology follow up or arranged HD chair  Pt has presented twice to the ED at [facility] for evaluation for HD - last HD was last Wednesday  This am, pt was acutely SOB  And had witnessed collapse in front of her daughter  On arrival EMS found pt to be in PEA arrest- CPR was initiated  Approximately 45 minutes estimated downtime before ROSC was achieved  On my exam, pt is intubated, sedated, non responsive to noxious stimuli, on bicarb gtt and 2 of levophed  History obtained from child and chart review  -------------------------------------------------------------------------------------------------------------  Dispo: Admit to Critical Care     Code Status: Level 2 - DNAR: but accepts endotracheal intubation  --------------------------------------------------------------------------------------------------------------  Review of Systems   Unable to perform ROS: Patient unresponsive         Physical Exam   Constitutional: Vital signs are normal  She appears cachectic  She appears toxic  She has a sickly appearance  She is sedated, intubated and restrained  Eyes:   Pinpoint pupils   Neck: JVD present  Cardiovascular: Frequent extrasystoles are present  Tachycardia present  Murmur heard  Systolic murmur is present with a grade of 2/6  Pulmonary/Chest: Effort normal  She is intubated  Abdominal: Soft  She exhibits no distension  There is no tenderness  Musculoskeletal: She exhibits no edema  Neurological: GCS eye subscore is 1  GCS verbal subscore is 1  GCS motor subscore is 1  Upward gaze  Intermittent myoclonus/seizure activity  Over breathing ventilator  No corneal reflexes   Skin: Skin is dry  Capillary refill takes less than 2 seconds  Nursing note and vitals reviewed      --------------------------------------------------------------------------------------------------------------  Vitals:   Vitals:    06/29/20 1452 06/29/20 1454 06/29/20 1456 06/29/20 1457   BP: 150/69   BP Location:       Pulse:  (!) 113 (!) 109    Resp:  21 (!) 48    Temp:  (!) 95 2 °F (35 1 °C) (!) 95 2 °F (35 1 °C)    TempSrc:       SpO2: (!) 36%  (!) 30%    Weight:         Temp  Min: 91 9 °F (33 3 °C)  Max: 95 7 °F (35 4 °C)  IBW: -92 5 kg     Body mass index is 15 24 kg/m²      Laboratory and Diagnostics:  Results from last 7 days   Lab Units 06/29/20  1505 06/29/20  1350 06/29/20  1143 06/29/20  0751   WBC Thousand/uL  --   --   --  7 03   HEMOGLOBIN g/dL  --   --   --  10 3*   I STAT HEMOGLOBIN g/dl 10 9*  --  11 9  --    HEMATOCRIT %  --   --   --  34 8   HEMATOCRIT, ISTAT % 32*  --  35  --    PLATELETS Thousands/uL  --  208  --  146*   MONO PCT %  --   --   --  4     Results from last 7 days   Lab Units 06/29/20  1552 06/29/20  1505 06/29/20  1351 06/29/20  1143 06/29/20  0751   SODIUM mmol/L  --   --  144  --  157*   POTASSIUM mmol/L  --   --  4 5  --  4 4   CHLORIDE mmol/L  --   --  102  --  112*   CO2 mmol/L  --   --  26  --  32   CO2, I-STAT mmol/L  --  35*  --  27  --    ANION GAP mmol/L  --   --  16*  --  13   BUN mg/dL  --   --  97*  --  90*   CREATININE mg/dL  --   --  10 79*  --  10 41*   CALCIUM mg/dL  --   --  9 4  --  14 3*   GLUCOSE RANDOM mg/dL 456*  --  324*  --  318*   ALT U/L  --   --   --   --  52   AST U/L  --   --   --   --  57*   ALK PHOS U/L  --   --   --   --  197*   ALBUMIN g/dL  --   --   --   --  2 9*   TOTAL BILIRUBIN mg/dL  --   --   --   --  0 30     Results from last 7 days   Lab Units 06/29/20  1351 06/29/20  0751   MAGNESIUM mg/dL 2 5 2 9*   PHOSPHORUS mg/dL 7 8* 8 9*           Results from last 7 days   Lab Units 06/29/20  1350 06/29/20  0751   TROPONIN I ng/mL 3 02* 0 03     Results from last 7 days   Lab Units 06/29/20  1350 06/29/20  0946 06/29/20  0751   LACTIC ACID mmol/L 1 6 5 9* 12 4*     ABG:  Results from last 7 days   Lab Units 06/29/20  0844   PH ART  7 218*   PCO2 ART mm Hg 40 6   PO2 ART mm Hg 366 0*   HCO3 ART mmol/L 16 2*   BASE EXC ART mmol/L -10 9 ABG SOURCE  Brachial, Left     VBG:  Results from last 7 days   Lab Units 06/29/20  0844   ABG SOURCE  Brachial, Left           Micro:        EKG: Sinus tachy - st changes in inferior leads  Imaging: I have personally reviewed pertinent reports  and I have personally reviewed pertinent films in PACS      Historical Information   Past Medical History:   Diagnosis Date    [facility]brovascular accident ([facility]A) [facility] ) 3/20/2019    Dementia ([facility] )     Diabetes mellitus ([facility] )     Hypertension     Renal disorder     Stroke [facility])      Past Surgical History:   Procedure Laterality Date    ADENOIDECTOMY      DIALYSIS FISTULA CREATION Right      Social History   Social History     Substance and Sexual Activity   Alcohol Use Yes    Frequency: Monthly or less    Drinks per session: 1 or 2    Binge frequency: Never     Social History     Substance and Sexual Activity   Drug Use Never     Social History     Tobacco Use   Smoking Status Current Every Day Smoker    Packs/day: 0 25   Smokeless Tobacco Never Used       Family History:   History reviewed  No pertinent family history    I have reviewed this patient's family history and commented on sigificant items within the HPI      Medications:  Current Facility-Administered Medications   Medication Dose Route Frequency    atorvastatin (LIPITOR) tablet 40 mg  40 mg Oral Daily With Dinner    chlorhexidine (PERIDEX) 0 12 % oral rinse 15 mL  15 mL Swish & Spit Q12H [address]    heparin (porcine) subcutaneous injection 5,000 Units  5,000 Units Subcutaneous Q8H [address]    insulin regular (HumuLIN R,NovoLIN R) 1 Units/mL in sodium chloride 0 9 % 100 mL infusion  0 3-21 Units/hr Intravenous Titrated    levETIRAcetam (KEPPRA) 500 mg in sodium chloride 0 9 % 100 mL IVPB  500 mg Intravenous Q12H [address]    LORazepam (ATIVAN) injection 2 mg  2 mg Intravenous Q4H PRN    norepinephrine (LEVOPHED) 8 mg (DOUBLE CONCENTRATION) IV in sodium chloride 0 9% 250 mL  1-30 mcg/min Intravenous Titrated    NxStage K 4/Ca 3 dialysis solution (RFP-401) 20,000 mL  20,000 mL Dialysis Continuous    omeprazole (PRILOSEC) suspension 2 mg/mL  20 mg Oral Daily    sodium bicarbonate 150 mEq in dextrose 5 % 1,000 mL infusion  100 mL/hr Intravenous Continuous     Home medications:  Prior to Admission Medications   Prescriptions Last Dose Informant Patient Reported? Taking?   atorvastatin (LIPITOR) 40 mg tablet   Yes No   Sig: Take 40 mg by mouth daily   calcium acetate (PHOSLO) 667 mg capsule   Yes No   Sig: Take by mouth 3 (three) times a day with meals   midodrine (PROAMATINE) 10 MG tablet   Yes No   Sig: Take 10 mg by mouth 3 (three) times a day      Facility-Administered Medications: None     Allergies:  No Known Allergies    ------------------------------------------------------------------------------------------------------------  Advance Directive and Living Will:      Power of :    POLST:    ------------------------------------------------------------------------------------------------------------  Anticipated Length of Stay is > 2 midnights        Sisalcira CheckVAN        Portions of the record may have been created with voice recognition software  Occasional wrong word or "sound a like" substitutions may have occurred due to the inherent limitations of voice recognition software    Read the chart carefully and recognize, using context, where substitutions have occurred

## 2020-06-29 NOTE — ASSESSMENT & PLAN NOTE
Pt with witnessed collapse after sudden onset of acute SOB  PT in PEA arrest on EMS arrival  Received 7 rounds of EPI, 1 bicarb and calcium on route  On arrival - received EPI and multiple amps of bicarb and achieved ROSC  Approximate down time 45 minutes  Likely etiology acidemia vs hypoxia vs electrolyte abnormalities  Pt continues on levophed - wean as tolerated  Emergent CRRT  Echocardiogram pending  Pt temp 92 Farenheit - avoid fever - monitor neurologic status   CT head with chronic infarcts and possible lucency  Repeat CT head vs MRI for further eludication of possible anoxic brain injury

## 2020-06-29 NOTE — CONSULTS
Consultation - Nephrology   Farzana Lozano 75 y o  female MRN: [id number]  Unit/Bed#: ICU 01 Encounter: [id number]    Inpatient consult to Nephrology  Consult performed by: Rodrick Butcher MD  Consult ordered by: Nery Holguin PA-C          History of Present Illness   Physician Requesting Consult: Filemon Lucero MD  Reason for Consult / Principal Problem:  ESRD/volume  Hx and PE limited by:  Patient is on a ventilator sedated    HPI: Farzana Lozano is a 75y o  year old female with history of ESRD on maintenance hemodialysis/hypertension/diabetes mellitus type 2/dementia/CVA/aortic stenosis/questionable cardiomyopathy who apparently had [place] from a different state initially without any formal dialysis established apparently was last dialyzed? Tuesday of last week  Presented as a cardiac arrest from home found to be severely acidotic subsequently received resuscitation/CPR including bicarbonate  Currently patient is ventilated and obtunded  She also is having some seizure movements  No other history obtainable from the patient  History obtained from critical care team/chart which I completely reviewed      Review of systems are unobtainable as patient is on the ventilator    Historical Information   Past Medical History:   Diagnosis Date    [facility]brovascular accident (CVA) [facility] ) 3/20/2019    Dementia ([facility] )     Diabetes mellitus ([facility] )     Hypertension     Renal disorder     Stroke ([facility] )    · Aortic stenosis  · ? Mitral valve stenosis  · ?   Cardiomyopathy    Past Surgical History:   Procedure Laterality Date    ADENOIDECTOMY      DIALYSIS FISTULA CREATION Right      Social History   Social History     Substance and Sexual Activity   Alcohol Use Yes    Frequency: Monthly or less    Drinks per session: 1 or 2    Binge frequency: Never     Social History     Substance and Sexual Activity   Drug Use Never     Social History     Tobacco Use   Smoking Status Current Every Day Smoker    Packs/day: 0 25   Smokeless Tobacco Never Used     History reviewed  No pertinent family history      Meds/Allergies   all current active meds have been reviewed, current meds:   Current Facility-Administered Medications   Medication Dose Route Frequency    atorvastatin (LIPITOR) tablet 40 mg  40 mg Oral Daily With Dinner    chlorhexidine (PERIDEX) 0 12 % oral rinse 15 mL  15 mL Swish & Spit Q12H [facility]    fentaNYL (SUBLIMAZE) injection 50 mcg  50 mcg Intravenous Q2H PRN    heparin (porcine) subcutaneous injection 5,000 Units  5,000 Units Subcutaneous Q8H [facility]    insulin regular (HumuLIN R,NovoLIN R) 1 Units/mL in sodium chloride 0 9 % 100 mL infusion  0 3-21 Units/hr Intravenous Titrated    norepinephrine (LEVOPHED) 4 mg (STANDARD CONCENTRATION) IV in sodium chloride 0 9% 250 mL  1-30 mcg/min Intravenous Titrated    omeprazole (PRILOSEC) suspension 2 mg/mL  20 mg Oral Daily    sodium bicarbonate 150 mEq in dextrose 5 % 1,000 mL infusion  100 mL/hr Intravenous Continuous    and PTA meds:    Medications Prior to Admission   Medication    atorvastatin (LIPITOR) 40 mg tablet    calcium acetate (PHOSLO) 667 mg capsule    midodrine (PROAMATINE) 10 MG tablet       No Known Allergies    Objective   No intake or output data in the 24 hours ending 06/29/20 1108  Patient Vitals for the past 24 hrs:   BP Temp Temp src Pulse Resp SpO2   06/29/20 0945 110/55   82 18 100 %   06/29/20 0930 118/57   82 18 100 %   06/29/20 0915 115/54   82 18 100 %   06/29/20 0900 123/60   84 17 100 %   06/29/20 0845 125/58   96  100 %   06/29/20 0842 139/64 (!) 91 9 °F (33 3 °C) Tympanic 88  100 %   06/29/20 0833 131/69   90 18 99 %   06/29/20 0827 138/72   88 18 100 %   06/29/20 0824      100 %   06/29/20 0804 (!) 78/50   86 18 97 %   06/29/20 0758 111/57   82  100 %   06/29/20 0751 137/77 (!) 92 6 °F (33 7 °C) Tympanic (!) 130  98 %       Invasive Devices:      Vitals:    06/29/20 0945   BP: 110/55   Pulse: 82 Resp: 18   Temp:    SpO2: 100%     General:  Thin appearing on a ventilator nonresponsive  Skin:  No acute rash  Eyes:  No scleral icterus and noninjected, pupils pinpoint  ENT:  Mucous membranes appear moist; intubated  Neck:  Supple, no jugular venous distention, no carotid bruits, 1+ carotid upstroke, no thyromegaly, trachea appears midline  Back:  Difficult but no overt CVA tenderness  Chest:  Bilateral coarse rhonchi  CVS:  Regular rate and rhythm without a murmur rub or gallops appreciable  Abdomen:  Soft and nontender normal bowel sounds, no hepatosplenomegaly or bruits appreciated  Extremities:  No clubbing, no cyanosis, no edema, some mildly cool lower extremities but 1+ dorsalis pedis pulses bilateral, no femoral bruits; no significant arthritic changes; right upper extremity AV fistula with good bruit and thrill  Neuro:  Unresponsive unable to assess  Psych:  Unresponsive unable to assess    Current Weight:    First Weight:      Lab Results:  I have personally reviewed pertinent labs    CBC:   Lab Results   Component Value Date    WBC 7 03 06/29/2020    RBC 3 57 (L) 06/29/2020     CMP:   Lab Results   Component Value Date     (H) 06/29/2020    CO2 32 06/29/2020    BUN 90 (H) 06/29/2020    CREATININE 10 41 (H) 06/29/2020    CALCIUM 14 3 (HH) 06/29/2020    AST 57 (H) 06/29/2020    ALT 52 06/29/2020    ALKPHOS 197 (H) 06/29/2020    EGFR 4 06/29/2020     Phosphorus:   Lab Results   Component Value Date    PHOS 8 9 (H) 06/29/2020     Magnesium:   Lab Results   Component Value Date    MG 2 9 (H) 06/29/2020     Urinalysis: No results found for: COLORU, CLARITYU, SPECGRAV, PHUR, LEUKOCYTESUR, NITRITE, PROTEINUA, GLUCOSEU, KETONESU, BILIRUBINUR, BLOODU  BMP:   Lab Results   Component Value Date    SODIUM 157 (H) 06/29/2020    CO2 32 06/29/2020    BUN 90 (H) 06/29/2020    CREATININE 10 41 (H) 06/29/2020    CALCIUM 14 3 (HH) 06/29/2020     ABGs:  PH 7 26/pCO2 35/PO2 366 on a ventilator    CT of the chest abdomen pelvis:  Bilateral consolidations with ground-glass opacity  Severe T6 compression fracture with kyphosis  Questionable displacement sternal fracture  Bilateral renal atrophy  Incidental thyroid nodule    CT of the head demonstrated apparent lucency the right occipital lobe  Chronic infarcts are noted in the left frontal lobe as well as right ganglia capsular region  Assessment/Plan   49-year-old with history of diabetes mellitus/hypertension/dementia/ESRD/aortic stenosis/?  Mitral valve stenosis/?  Cardiomyopathy who underwent a cardiac arrest at home the  Apparently she had skipped hemodialysis for at least almost 1 week at this time  We are asked to see her for ESRD  1  ESRD:  · Question of hemodialysis being missed at least 1 week? Outpatient facility arrangements at this time after the move is unknown  · Access:  Right upper extremity AV fistula working well  Given severe electrolyte abnormalities and the cardiac arrest with seizures I would absolutely recommend CRRT verses intermittent hemodialysis as the patient is still on pressors  Also the shifts related to the electrolytes/osmolality in the setting of seizures will be definitely slower more gentle verses intermittent hemodialysis  Recommendations  · CRRT:  Please see orders  I would go slower given abnormal electrolytes and possible azotemia with osmolar shifts as outlined  · Will need a temporary Jose catheter placement per Critical Care Medicine  · Slowly ultrafilter the setting of probable volume overload as discussed with Critical Care Medicine  · Monitor labs closely  · Consent obtained from daughter: Amada Todd   · Depending upon her course she will definitely need outpatient follow-up with the hemodialysis facility    2  Volume overload:  Related to noncompliance  Patient will undergo ultrafiltration with CRRT as outlined  3  Shock:  Secondary cardiac rest   Improving on pressor support    4  Electrolytes:  · Potassium and bicarbonate acceptable  Patient did receive sodium bicarbonate  · Hypernatremia secondary to iatrogenic sodium bicarbonate  Monitor under CRRT    5  Mineral bone disorder of ESRD:  Will be monitored with CRRT  Hyperphosphatemia:  No binders at this time until able to take adequate oral intake  Phosphorus should improve  6  Anemia of ESRD:  Most likely has not been receiving LIZANDRO agents  Hemoglobin acceptable at 10 3  Monitor  7  Monitor the mild thrombocytopenia  8  Status post cardiac arrest:  Being managed by critical care medicine  Guarded overall status  Discussed at great length with Critical Care Medicine regarding CRRT      Portions of the record may have been created with voice recognition software  Occasional wrong word or "sound a like" substitutions may have occurred due to the inherent limitations of voice recognition software  Read the chart carefully and recognize, using context, where substitutions have occurred

## 2020-06-30 PROBLEM — R94.01 ABNORMAL EEG: Status: ACTIVE | Noted: 2020-01-01

## 2020-06-30 PROBLEM — J96.01 ACUTE RESPIRATORY FAILURE WITH HYPOXIA (HCC): Status: ACTIVE | Noted: 2020-01-01

## 2020-06-30 PROBLEM — G93.40 ENCEPHALOPATHY ACUTE: Status: ACTIVE | Noted: 2020-01-01

## 2020-06-30 NOTE — CONSULTS
Consultation - General Surgery   Katie Valles 75 y o  female MRN: [id number]  Unit/Bed#: [facility] 516-01 Encounter: [id number]    Assessment/Plan     Assessment:  64F s/p multiple cardiac arrests, concern for LLE compartment syndrome  Plan:  - Good left DP signal, weakly palpable  - calf soft  - GCS low, no response to painful maneuvers  - MICU care appreciated; discussion regarding prognosis already had with family at bedside    History of Present Illness   HPI:  Katie Valles is a 75 y o  female who presents with PEA arrest x3  She has a history of dementia, CVA, HTN, DM2, severe AS, COPD on 2LNC, decompensated heart failure  She is unable to provide history  Inpatient consult to Acute Care Surgery     Date/Time 6/30/2020 1:33 AM     Performed by  Prashant Harrison MD     Authorized by Melo Nevarez PA-C              Review of Systems   Unable to perform ROS: Intubated       Historical Information   Past Medical History:   Diagnosis Date    Cerebrovascular accident (CVA) ([facility] [address] ) 3/20/2019    Dementia ([facility] )     Diabetes mellitus ([facility] )     Hypertension     Renal disorder     Stroke [facility])      Past Surgical History:   Procedure Laterality Date    ADENOIDECTOMY      DIALYSIS FISTULA CREATION Right      Social History   Social History     Substance and Sexual Activity   Alcohol Use Yes    Frequency: Monthly or less    Drinks per session: 1 or 2    Binge frequency: Never     Social History     Substance and Sexual Activity   Drug Use Never     E-Cigarette/Vaping    E-Cigarette Use Never User      E-Cigarette/Vaping Substances     Social History     Tobacco Use   Smoking Status Current Every Day Smoker    Packs/day: 0 25   Smokeless Tobacco Never Used     Family History: No family history on file      Meds/Allergies   all current active meds have been reviewed  No Known Allergies    Objective   First Vitals:   Pulse: 82 (06/29/20 2300)  Respirations: (!) 32 (06/29/20 2300)  SpO2: (!) 73 % (06/29/20 2300)    Current Vitals:   Pulse: 82 (06/29/20 2300)  Respirations: (!) 32 (06/29/20 2300)  SpO2: (!) 73 % (06/29/20 2300)    No intake or output data in the 24 hours ending 06/29/20 2348    Invasive Devices     Central Venous Catheter Line            CVC Central Lines 06/29/20 less than 1 day          Peripheral Intravenous Line            Peripheral IV 06/29/20 Left Foot less than 1 day          Arterial Line            Arterial Line 06/29/20 Left Femoral less than 1 day          Hemodialysis Catheter            HD Temporary Double Catheter less than 1 day    Hemodialysis Catheter Femoral less than 1 day          Airway            ETT  Cuffed 7 mm less than 1 day                Physical Exam   Constitutional: She appears well-developed and well-nourished  No distress  HENT:   Head: Normocephalic and atraumatic  Right Ear: External ear normal    Left Ear: External ear normal    Eyes: Right eye exhibits no discharge  Left eye exhibits no discharge  No scleral icterus  Neck: No tracheal deviation present  Cardiovascular:   Pulses:       Dorsalis pedis pulses are 1+ on the left side  Pulmonary/Chest: Effort normal  No respiratory distress  Abdominal: Soft  She exhibits no distension  There is no tenderness  Skin: She is not diaphoretic  Vitals reviewed  Lab Results: I have personally reviewed pertinent lab results  Imaging: I have personally reviewed pertinent reports  EKG, Pathology, and Other Studies: I have personally reviewed pertinent reports

## 2020-06-30 NOTE — RESPIRATORY THERAPY NOTE
RT Ventilator Management Note  Tu Ac 75 y o  female MRN: [id number]  Unit/Bed#: [facility] 498-81 Encounter: [id number]      Daily Screen       6/30/2020  0025             Patient safety screen outcome[de-identified]  Failed    Not Ready for Weaning due to[de-identified]  FiO2 >60%; Underline problem not resolved            Physical Exam:   Assessment Type: Assess only  General Appearance: Sedated  Respiratory Pattern: Assisted  Chest Assessment: Chest expansion symmetrical  Bilateral Breath Sounds: (P) Diminished, Coarse  O2 Device: vent      Resp Comments: (P) Pt on vent settings found on AC/VC rate 16; FIO2 70% PEEP +8  SpO2 100% therefore decrease FIO2 to 60% at this time  No other changes made  Will continue to monitor patient per respiratory protocol

## 2020-06-30 NOTE — RESPIRATORY THERAPY NOTE
RT Protocol Note  Nestor Seymour 75 y o  female MRN: [id number]  Unit/Bed#: [facility] 046-50 Encounter: [id number]    Assessment    Active Problems:    * No active hospital problems   *      Home Pulmonary Medications:    Home Devices/Therapy: Home O2    Past Medical History:   Diagnosis Date    C[facility]brovascular accident (CVA) ([facility] ) 3/20/2019    Dementia ([place] [address] )     Diabetes mellitus ([place] [address] )     Hypertension     Renal disorder     Stroke [facility])      Social History     Socioeconomic History    Marital status:      Spouse name: Not on file    Number of children: Not on file    Years of education: Not on file    Highest education level: Not on file   Occupational History    Not on file   Social Needs    Financial resource strain: Not on file    Food insecurity:     Worry: Not on file     Inability: Not on file    Transportation needs:     Medical: Not on file     Non-medical: Not on file   Tobacco Use    Smoking status: Current Every Day Smoker     Packs/day: 0 25    Smokeless tobacco: Never Used   Substance and Sexual Activity    Alcohol use: Yes     Frequency: Monthly or less     Drinks per session: 1 or 2     Binge frequency: Never    Drug use: Never    Sexual activity: Not on file   Lifestyle    Physical activity:     Days per week: Not on file     Minutes per session: Not on file    Stress: Not on file   Relationships    Social connections:     Talks on phone: Not on file     Gets together: Not on file     Attends Taoist service: Not on file     Active member of club or organization: Not on file     Attends meetings of clubs or organizations: Not on file     Relationship status: Not on file    Intimate partner violence:     Fear of current or ex partner: Not on file     Emotionally abused: Not on file     Physically abused: Not on file     Forced sexual activity: Not on file   Other Topics Concern    Not on file   Social History Narrative    Not on file       Subjective Objective    Physical Exam:   Assessment Type: Assess only  General Appearance: Sedated  Respiratory Pattern: Assisted  Chest Assessment: Chest expansion symmetrical  Bilateral Breath Sounds: Coarse    Vitals:  Pulse 82, resp  rate (!) 32, SpO2 (!) 73 %  Results from last 7 days   Lab Units 06/29/20  2232  06/29/20  0844   PH ART  7 406   < > 7 218*   PCO2 ART mm Hg 39 9   < > 40 6   PO2 ART mm Hg 69 5*   < > 366 0*   HCO3 ART mmol/L 24 5   < > 16 2*   BASE EXC ART mmol/L -0 1   < > -10 9   O2 CONTENT ART mL/dL 15 4*   < > 17 3   O2 HGB, ARTERIAL % 91 1*   < > 99 3*   ABG SOURCE  Line, Arterial   < > Brachial, Left   KIRK TEST   --   --  Yes    < > = values in this interval not displayed  Imaging and other studies: I have personally reviewed pertinent reports  Plan    Respiratory Plan: Vent/NIV/HFNC        Resp Comments: (P) pt was a transfer from [address] [place] [address]  Pt has coarse breath sounds, ETT was 19 at the lip and cxr was done to confirm tube placement  pt was taken off of transport vent and placed on vent in room, pt is tolerating fairly well

## 2020-06-30 NOTE — PROCEDURES
Arterial Line Insertion  Date/Time: 6/29/2020 3:30 PM  Performed by: Kiran Schultz MD  Authorized by: Kiran Schultz MD     Patient location:  ICU and bedside  Consent:     Consent obtained:  Emergent situation  Universal protocol:     Patient identity confirmed:  Arm band  Indications:     Indications: hemodynamic monitoring and multiple ABGs    Pre-procedure details:     Skin preparation:  Chlorhexidine    Preparation: Patient was prepped and draped in sterile fashion    Anesthesia (see MAR for exact dosages): Anesthesia method:  None  Procedure details:     Location / Tip of Catheter:  Femoral    Laterality:  Left    Needle gauge:  20 G    Placement technique:  Percutaneous and Seldinger    Number of attempts:  1    Successful placement: yes      Transducer: waveform confirmed    Post-procedure details:     Post-procedure:  Biopatch applied, sutured and sterile dressing applied    Patient tolerance of procedure:   Tolerated well, no immediate complications

## 2020-06-30 NOTE — SOCIAL WORK
Cm went to see pt, MD speaking with daughter at bedside re goals of care  Cm to f/u  Pt was transferred from [facility], currently intubated  Cm met with daughter Shirley Galvez requested that  [employer] message be sent so that pt's grandson and Radha's son Panchito Harrison who is on [address] [facility] [address] in [place] can visit pt given her current status  Cm contacted [facility] @ [phone]  Spoke with [facility] and provided requested information  Case # [id number] provided and giorgi made Shirley Galvez aware

## 2020-06-30 NOTE — H&P
H&P Exam - [facility] 75 y o  female MRN: [id number]  Unit/Bed#: [facility] 516-01 Encounter: [id number]      -------------------------------------------------------------------------------------------------------------  Chief Complaint: Acute encephalopathy s/p cardiac arrest    History of Present Illness   HX and PE limited by: cardiac arrest, encephalopathy  June Mora is a 75 y o  female who presents to [address] [place] [address] as an out of hospital cardiac arrest   Patient had told her daughter Abeba Long can not breathe and became unresponsive  CPR was initiated by EMS upon their arrival   Presenting rhythm was pea and ultimately ROSC was achieved after 45 minutes of ACLS  She did sustain another PE a arrest at [place] later that afternoon and ROSC was regained after 2 rounds of CPR  Etiology of the cardiac arrest is unclear however patient last dialysis session was on 06/24 so possible electrolyte versus metabolic mediated  Patient had been getting dialysis in [address] [place] [address], but recently had moved to [place] and had not established care  Post cardiac arrest patient was admitted to the ICU and required norepinephrine for blood pressure support, a right femoral hemodialysis catheter was placed and CRRT initiated, her filter clotted twice  There were attempts to obtain central access from both internal jugular veins which were unsuccessful due to inability to pass wire past 20 cm  As the day continued she was noted to have intermittent jerking activity with gaze preference, questionable seizure-like activity versus myoclonus and she was transferred to [facility] for continuous video EEG monitoring  PMHx: Severe aortic stenosis (previously declined TAVR) CHF, ESRD on dialysis, stroke, HTN    History obtained from child and chart review    -------------------------------------------------------------------------------------------------------------  Assessment and Plan:    Neuro:  Diagnosis: Acute encephalopathy following cardiac arrest, history of prior stroke in 2009 ( per daughter no residual deficits)  o Plan:   - High suspicion for anoxic injury given 45 minutes CPR, now with jerking motions  To be placed on continuous video EEG monitoring  Turn propofol off once hooked up  - Delirium precautions  - Trend neuro exam   - Initiate targeted temperature management protocol  - Discontinue Keppra   - Avoid sedation if able    CV:    Diagnosis: Cardiac arrest in setting of ESRD, severe aortic stenosis, mitral valve stenosis, CHF  o Plan:   - Unclear etiology however given patient ESRD on dialysis who had not been dialyzed in 5 days, likely metabolic abnormality  - Follow up ECHO read  - Previous aortic valve area 0 7, per daughter patient was evaluated by CT surgery at [facility] and was refused as she was deemed too high risk for TAVR   Diagnosis:  Elevated troponin in setting of cardiac arrest  o Plan:   - Follow-up echo  - EKG without ST elevation, troponin leak likely secondary to cardiac arrest    Diagnosis:  Hypotension-unclear etiology  o Plan:   - May be cardiac component post cardiac arrest, no evidence of sepsis this time, does take midodrine at home unclear when her last doses was  Restart midodrine  - Wean Levophed for map greater than 65      Pulm:   Diagnosis:  Acute hypoxic respiratory failure  o Plan:   - Pulmonary edema noted on chest x-ray likely secondary to lack of dialysis    Volume removal with CVVH  - Current vent settings AC VC 16/3 50/70%/8 wean FiO2 as able her SpO2 greater than 90%  - Per records patient has been using home O2 at 2 liters/minute since hospitalization earlier this month for Congestive heart failure exacerbation  - VAP bundle    GI:    Diagnosis:  Hep C  o Plan:  No evidence of decompensated liver failure at this time  o Monitor LFTs  o Will need outpatient follow-up as appropriate    :    Diagnosis:  ESRD on dialysis  o Plan: Continue CVVH at this time due to volume overload  o Bladder scan q shift      F/E/N:    Plan:  NPO, electrolyte replacement her CVVH protocol      Heme/Onc:   o Subcu heparin and SCDs for DVT chemoprophylaxis      Endo:    Diagnosis:  History of diabetes per daughter however A1c 5 2  o Plan:  Fingerstick q 6 while NPO, if hyperglycemia will start sliding scale, discontinue insulin drip      ID:   o Trend WBC and fever curve      MSK/Skin:   o Turn q2      Disposition: Admit to Critical Care   Code Status: Level 2 - DNAR: but accepts endotracheal intubation  --------------------------------------------------------------------------------------------------------------  Review of Systems   Unable to perform ROS: Intubated       Physical Exam   Constitutional: No distress  Appears older than stated age   HENT:   Head: Normocephalic and atraumatic  Eyes:   4mm and slugglish reactivity b/l   Neck:   Too much adipose tissue to determine JVD   Cardiovascular: Normal rate and regular rhythm  Dopplar signals DP/PT b/l    Pulmonary/Chest: Effort normal    Coarse b/l breath sounds   Abdominal: Soft  She exhibits no distension  Musculoskeletal:   Swelilng of the left lower calf, tense compartment    Neurological:   Does not respond to verbal stimuli  To noxious stimuli she has upward gaze and random twitching of extremities  + gag/corneal/overbreathes vent      --------------------------------------------------------------------------------------------------------------  Vitals:   Vitals:    06/29/20 2300   Pulse: 82   Resp: (!) 32   SpO2: (!) 73%     Temp  Min: 91 9 °F (33 3 °C)  Max: 97 9 °F (36 6 °C)        There is no height or weight on file to calculate BMI    N/A    Laboratory and Diagnostics:  Results from last 7 days   Lab Units 06/29/20  1745 06/29/20  1505 06/29/20  1350 06/29/20  1143 06/29/20  0751   WBC Thousand/uL  --   --   --   --  7 03   HEMOGLOBIN g/dL 11 6  --   --   --  10 3*   I STAT HEMOGLOBIN g/dl  --  10 9*  --  11 9  --    HEMATOCRIT % 36 1  --   --   --  34 8   HEMATOCRIT, ISTAT %  --  32*  --  35  --    PLATELETS Thousands/uL  --   --  208  --  146*   MONO PCT %  --   --   --   --  4     Results from last 7 days   Lab Units 06/29/20 2233 06/29/20 1854 06/29/20 1744 06/29/20  1552 06/29/20  1505 06/29/20  1351 06/29/20  1143 06/29/20  0751   SODIUM mmol/L 149*  --  152*  --   --  144  --  157*   POTASSIUM mmol/L 3 5  --  3 5  --   --  4 5  --  4 4   CHLORIDE mmol/L 108  --  105  --   --  102  --  112*   CO2 mmol/L 28  --  30  --   --  26  --  32   CO2, I-STAT mmol/L  --   --   --   --  35*  --  27  --    ANION GAP mmol/L 13  --  17*  --   --  16*  --  13   BUN mg/dL 84*  --  91*  --   --  97*  --  90*   CREATININE mg/dL 8 75*  --  9 96*  --   --  10 79*  --  10 41*   CALCIUM mg/dL 10 1  --  9 9  --   --  9 4  --  14 3*   GLUCOSE RANDOM mg/dL 79 220* 287* 456*  --  324*  --  318*   ALT U/L  --   --   --   --   --   --   --  52   AST U/L  --   --   --   --   --   --   --  57*   ALK PHOS U/L  --   --   --   --   --   --   --  197*   ALBUMIN g/dL  --   --   --   --   --   --   --  2 9*   TOTAL BILIRUBIN mg/dL  --   --   --   --   --   --   --  0 30     Results from last 7 days   Lab Units 06/29/20 2233 06/29/20 1744 06/29/20  1351 06/29/20  0751   MAGNESIUM mg/dL 2 3 2 4 2 5 2 9*   PHOSPHORUS mg/dL 4 5* 5 0* 7 8* 8 9*           Results from last 7 days   Lab Units 06/29/20 2233 06/29/20  1745 06/29/20  1350 06/29/20  0751   TROPONIN I ng/mL 9 31* 5 79* 3 02* 0 03     Results from last 7 days   Lab Units 06/29/20  1350 06/29/20  0946 06/29/20  0751   LACTIC ACID mmol/L 1 6 5 9* 12 4*     ABG:  Results from last 7 days   Lab Units 06/30/20  0015   PH ART  7 491*   PCO2 ART mm Hg 33 4*   PO2 ART mm Hg 130 3*   HCO3 ART mmol/L 24 9   BASE EXC ART mmol/L 1 9   ABG SOURCE  Line, Arterial     VBG:  Results from last 7 days   Lab Units 06/30/20  0015   ABG SOURCE  Line, Arterial           Micro:        EKG: pending  Imaging: I have personally reviewed pertinent reports  Historical Information   Past Medical History:   Diagnosis Date    [facility] ([facility] [address] [facility] 3/20/2019    [facility]     [facility]     Hypertension     Renal disorder     Stroke [facility])      Past Surgical History:   Procedure Laterality Date    ADENOIDECTOMY      DIALYSIS FISTULA CREATION Right      Social History   Social History     Substance and Sexual Activity   Alcohol Use Yes    Frequency: Monthly or less    Drinks per session: 1 or 2    Binge frequency: Never     Social History     Substance and Sexual Activity   Drug Use Never     Social History     Tobacco Use   Smoking Status Current Every Day Smoker    Packs/day: 0 25   Smokeless Tobacco Never Used     Exercise History: n/a  Family History:   No family history on file    I have reviewed this patient's family history and commented on sigificant items within the HPI      Medications:  Current Facility-Administered Medications   Medication Dose Route Frequency    atorvastatin (LIPITOR) tablet 40 mg  40 mg Oral Daily With Dinner    chlorhexidine (PERIDEX) 0 12 % oral rinse 15 mL  15 mL Swish & Spit Q12H [address]    dextrose 50 % IV solution **ADS Override Pull**        dextrose 50 % IV solution 25 g  25 g Intravenous Once    levETIRAcetam (KEPPRA) 500 mg in sodium chloride 0 9 % 100 mL IVPB  500 mg Intravenous Q12H [address]    LORazepam (ATIVAN) injection 2 mg  2 mg Intravenous Q4H PRN    midodrine (PROAMATINE) tablet 10 mg  10 mg Oral TID    nicotine (NICODERM CQ) 7 mg/24hr TD 24 hr patch 7 mg  7 mg Transdermal Daily    norepinephrine (LEVOPHED) 8 mg (DOUBLE CONCENTRATION) IV in sodium chloride 0 9% 250 mL  1-30 mcg/min Intravenous Titrated    NxStage K 4/Ca 3 dialysis solution (RFP-401) 20,000 mL  20,000 mL Dialysis Continuous    omeprazole (PRILOSEC) suspension 2 mg/mL  20 mg Oral Daily     Home medications:  Prior to Admission Medications Prescriptions Last Dose Informant Patient Reported? Taking?   atorvastatin (LIPITOR) 40 mg tablet   Yes No   Sig: Take 40 mg by mouth daily   calcium acetate (PHOSLO) 667 mg capsule   Yes No   Sig: Take by mouth 3 (three) times a day with meals   midodrine (PROAMATINE) 10 MG tablet   Yes No   Sig: Take 10 mg by mouth 3 (three) times a day      Facility-Administered Medications: None     Allergies:  No Known Allergies  ------------------------------------------------------------------------------------------------------------  Advance Directive and Living Will:      Power of :    POLST:    ------------------------------------------------------------------------------------------------------------  Anticipated Length of Stay is > 2 midnights    Care Time Delivered:   No Critical Care time spent       Mónica Greene PA-C        Portions of the record may have been created with voice recognition software  Occasional wrong word or "sound a like" substitutions may have occurred due to the inherent limitations of voice recognition software    Read the chart carefully and recognize, using context, where substitutions have occurred

## 2020-06-30 NOTE — PROCEDURES
Central Line Insertion  Date/Time: 6/29/2020 6:30 PM  Performed by: Rosmery Carey MD  Authorized by: Rosmery Carey MD     Patient location:  Bedside and ICU  Consent:     Consent obtained:  Verbal    Consent given by:  Healthcare agent (daughter)    Risks discussed:  Arterial puncture, incorrect placement, bleeding, infection and pneumothorax    Alternatives discussed:  No treatment  Universal protocol:     Procedure explained and questions answered to patient or proxy's satisfaction: yes      Relevant documents present and verified: yes      Test results available and properly labeled: yes      Radiology Images displayed and confirmed  If images not available, report reviewed: yes      Required blood products, implants, devices, and special equipment available: yes      Immediately prior to procedure, a time out was called: yes      Patient identity confirmed:  Arm band  Pre-procedure details:     Hand hygiene: Hand hygiene performed prior to insertion      Sterile barrier technique: All elements of maximal sterile technique followed      Skin preparation:  2% chlorhexidine    Skin preparation agent: Skin preparation agent completely dried prior to procedure    Indications:     Central line indications: medications requiring central line and dialysis    Procedure details:     Location:  Right internal jugular    Vessel type: vein      Laterality:  Right    Approach: percutaneous technique used      Patient position:  Trendelenburg    Ultrasound guidance: yes      Sterile ultrasound techniques: Sterile gel and sterile probe covers were used    Comments:      Large right external jugular vein identified with ultrasound  Right internal jugular identified, but vessel was small with aberrant path and branching, situated immediately anterior to carotid  EJ successfully avoided on needle pass  Right carotid accessed with finder needle on initial attempt   Right carotid placement immediately recognized by manometry before wire was threaded or vessel dilated  Finder needle removed and pressure held  Three attempts were made on left internal jugular, which was easily accessed on every attempt  Venous placement of finder needle confirmed by manometry and wire visualized in internal jugular by ultrasound  Wire met obstruction at 15-20 cm on every attempt despite different sites and angles for each attempt, and manipulation of wire during thread  Procedure ultimately aborted

## 2020-06-30 NOTE — PROGRESS NOTES
Daily Progress Note - Critical Care   Chris Jones 75 y o  female MRN: [id number]  Unit/Bed#: [facility] 516-01 Encounter: [id number]        ----------------------------------------------------------------------------------------  HPI/24hr events: Overnight started on cEEG, propofol discontinued  Restarted on CVVH running -50cc/hr without difficulty, weaned off levophed, hypoglycemic event requiring d50, insulin gtt discontinued     ---------------------------------------------------------------------------------------  SUBJECTIVE  intubated    Review of Systems  Review of systems was unable to be performed secondary to encephalopathy  ---------------------------------------------------------------------------------------  Assessment and Plan:    Neuro:    Diagnosis: Acute encephalopathy in setting of cardiac arrest, hx of previous stroke  o Plan:   - Trend neuro exam  - Continue targeted temperature management  - Hold all sedation  - cEEG to r/o seizure activity      CV:    Diagnosis: Cardiac arrest, severe aortic stenosis, mitral stenosis, CHF  o Plan:   § Unclear etiology however given patient ESRD on dialysis who had not been dialyzed in 5 days, likely metabolic abnormality  § Follow up ECHO read  § Previous aortic valve area 0 7, per daughter patient was evaluated by CT surgery at [facility] and was refused as she was deemed too high risk for TAVR  · Diagnosis:  Elevated troponin in setting of cardiac arrest  ? Plan:   § Follow-up echo  § EKG without ST elevation, troponin leak likely secondary to cardiac arrest, now downtrending  · Diagnosis:  Hypotension-resolved  ? Plan:   § Continue midodrine 10mg TID  § Wean Levophed for map greater than 65    Pulm:  · Diagnosis:  Acute hypoxic respiratory failure  ? Plan:   § Pulmonary edema noted on chest x-ray likely secondary to lack of dialysis    Volume removal with CVVH  § Current vent settings [facility] VC 16/3 50/70%/8 wean FiO2 as able her SpO2 greater than 90%  § Per records patient has been using home O2 at 2 liters/minute since hospitalization earlier this month for Congestive heart failure exacerbation  § VAP bundle    GI:   · Diagnosis:  Hep C  ? Plan:  No evidence of decompensated liver failure at this time  ? Monitor LFTs  ? Will need outpatient follow-up as appropriate     :   · Diagnosis:  ESRD on dialysis  ? Plan:  Continue CVVH at this time due to volume overload  ? Bladder scan q shift    F/E/N:    Plan: Consider starting TF      Heme/Onc:   o SQH/SCDs      Endo:    Diagnosis: Hx of DM  o Plan: A1c 5 2, diet controlled per daughter  Fingersticks while NPO      ID:   o No active isuses      MSK/Skin:   o No active issues      Disposition: Continue Critical Care   Code Status: Level 2 - DNAR: but accepts endotracheal intubation  ---------------------------------------------------------------------------------------  ICU CORE MEASURES    Prophylaxis   VTE Pharmacologic Prophylaxis: Heparin  VTE Mechanical Prophylaxis: sequential compression device  Stress Ulcer Prophylaxis: Prophylaxis Not Indicated     ABCDE Protocol (if indicated)  Plan to perform spontaneous awakening trial today? Yes  Plan to perform spontaneous breathing trial today? Yes  Obvious barriers to extubation?  Yes  CAM-ICU: Positive    Invasive Devices Review  Invasive Devices     Central Venous Catheter Line            CVC Central Lines 06/29/20 less than 1 day          Arterial Line            Arterial Line 06/29/20 Left Femoral less than 1 day          Line            Hemodialysis AV Fistula 06/29/20 Left Upper arm less than 1 day    Hemodialysis AV Fistula 06/29/20 Right Upper arm less than 1 day          Hemodialysis Catheter            HD Temporary Double Catheter less than 1 day    Hemodialysis Catheter Femoral less than 1 day          Drain            NG/OG Tube Orogastric Left mouth less than 1 day          Airway            ETT  Cuffed 7 mm less than 1 day              Can any invasive devices be discontinued today? No  ---------------------------------------------------------------------------------------  OBJECTIVE    Vitals   Vitals:    20 0300 20 0330 20 0347 20 0400   Pulse: 90 88  88   Resp: (!) 29 (!) 25  (!) 25   Temp:       TempSrc:       SpO2: 95% 95% 100% 99%     Temp (24hrs), Av 3 °F (35 2 °C), Min:91 8 °F (33 2 °C), Max:97 9 °F (36 6 °C)  Current: Temperature: (!) 96 1 °F (35 6 °C)      Respiratory:  SpO2: SpO2: 99 %       Invasive/non-invasive ventilation settings   Respiratory    Lab Data (Last 4 hours)    None         O2/Vent Data (Last 4 hours)       0347           Vent Mode AC/VC       Resp Rate (BPM) (BPM) 16       Vt (mL) (mL) 350       FIO2 (%) (%) 60       PEEP (cmH2O) (cmH2O) 8       MV 7 99                   Physical Exam   Constitutional: No distress  HENT:   Head: Normocephalic and atraumatic  Eyes: Pupils are equal, round, and reactive to light  Cardiovascular: Normal rate and regular rhythm  Exam reveals no gallop and no friction rub  Murmur heard  Pulmonary/Chest: Effort normal  No respiratory distress  She has no wheezes  Coarse b/l breath sounds    Abdominal: Soft  She exhibits no distension  Musculoskeletal:   Swollen left calf, unchanged from yesterday    Neurological:   Random jerking motions  Weak decorticate posturing in b/l upper extremities  Does not respond to verbal or noxious stimuili  Weak corneals, +gag/overbreathes   Skin: Skin is warm and dry           Laboratory and Diagnostics:  Results from last 7 days   Lab Units 20  1745 20  1505 20  1350 20  1143 20  0751   WBC Thousand/uL  --   --   --   --  7 03   HEMOGLOBIN g/dL 11 6  --   --   --  10 3*   I STAT HEMOGLOBIN g/dl  --  10 9*  --  11 9  --    HEMATOCRIT % 36 1  --   --   --  34 8   HEMATOCRIT, ISTAT %  --  32*  --  35  --    PLATELETS Thousands/uL  --   --  208  --  146*   MONO PCT %  --   --   --   --  4 Results from last 7 days   Lab Units 06/29/20 2233 06/29/20  1854 06/29/20  1744 06/29/20  1552 06/29/20  1505 06/29/20  1351 06/29/20  1143 06/29/20  0751   SODIUM mmol/L 149*  --  152*  --   --  144  --  157*   POTASSIUM mmol/L 3 5  --  3 5  --   --  4 5  --  4 4   CHLORIDE mmol/L 108  --  105  --   --  102  --  112*   CO2 mmol/L 28  --  30  --   --  26  --  32   CO2, I-STAT mmol/L  --   --   --   --  35*  --  27  --    ANION GAP mmol/L 13  --  17*  --   --  16*  --  13   BUN mg/dL 84*  --  91*  --   --  97*  --  90*   CREATININE mg/dL 8 75*  --  9 96*  --   --  10 79*  --  10 41*   CALCIUM mg/dL 10 1  --  9 9  --   --  9 4  --  14 3*   GLUCOSE RANDOM mg/dL 79 220* 287* 456*  --  324*  --  318*   ALT U/L  --   --   --   --   --   --   --  52   AST U/L  --   --   --   --   --   --   --  57*   ALK PHOS U/L  --   --   --   --   --   --   --  197*   ALBUMIN g/dL  --   --   --   --   --   --   --  2 9*   TOTAL BILIRUBIN mg/dL  --   --   --   --   --   --   --  0 30     Results from last 7 days   Lab Units 06/29/20 2233 06/29/20 1744 06/29/20  1351 06/29/20  0751   MAGNESIUM mg/dL 2 3 2 4 2 5 2 9*   PHOSPHORUS mg/dL 4 5* 5 0* 7 8* 8 9*           Results from last 7 days   Lab Units 06/30/20  0139 06/29/20 2233 06/29/20  1745 06/29/20  1350 06/29/20  0751   TROPONIN I ng/mL 8 63* 9 31* 5 79* 3 02* 0 03     Results from last 7 days   Lab Units 06/29/20  1350 06/29/20  0946 06/29/20  0751   LACTIC ACID mmol/L 1 6 5 9* 12 4*     ABG:  Results from last 7 days   Lab Units 06/30/20  0015   PH ART  7 491*   PCO2 ART mm Hg 33 4*   PO2 ART mm Hg 130 3*   HCO3 ART mmol/L 24 9   BASE EXC ART mmol/L 1 9   ABG SOURCE  Line, Arterial     VBG:  Results from last 7 days   Lab Units 06/30/20  0015   ABG SOURCE  Line, Arterial           Micro        EKG: no new  Imaging: no new     Intake and Output  I/O       06/28 0701 - 06/29 0700 06/29 0701 - 06/30 0700    I  V   58 4    NG/GT  0    Total Intake  58 4    Emesis/NG output  200 Other  43    Total Output  243    Net  -184 6                Height and Weights         There is no height or weight on file to calculate BMI  Weight (last 2 days)     None            Nutrition       Diet Orders   (From admission, onward)             Start     Ordered    06/29/20 2215  Diet NPO  Diet effective now     Question Answer Comment   Diet Type NPO    RD to adjust diet per protocol? Yes        06/29/20 2214                Active Medications  Scheduled Meds:  Current Facility-Administered Medications:  atorvastatin 40 mg Oral Daily With [facility]    chlorhexidine 15 mL Swish & Spit Q12H [facility] Rubin Gallardo PA-C    heparin (porcine) 5,000 Units Subcutaneous Q8H [facility] Hailey Padron PA-C    LORazepam 2 mg Intravenous Q4H PRN Rubin Gallardo PA-C    midodrine 10 mg Oral TID Rubin Gallardo PA-C    nicotine 7 mg Transdermal Daily Rubin Gallardo PA-C    norepinephrine 1-30 mcg/min Intravenous Titrated Rubin Gallardo PA-C Last Rate: Stopped (06/30/20 0200)   NxStage K 4/Ca 3 20,000 mL Dialysis Continuous Hailey Padron PA-C    omeprazole (PRILOSEC) suspension 2 mg/mL 20 mg Oral Daily Hailey Padron PA-C      Continuous Infusions:    norepinephrine 1-30 mcg/min Last Rate: Stopped (06/30/20 0200)   NxStage K 4/Ca 3 20,000 mL      PRN Meds:     LORazepam 2 mg Q4H PRN       Allergies   No Known Allergies  ---------------------------------------------------------------------------------------  Advance Directive and Living Will:      Power of :    POLST:    ---------------------------------------------------------------------------------------  Care Time Delivered:   No Critical Care time spent     Rubin Gallardo PA-C      Portions of the record may have been created with voice recognition software  Occasional wrong word or "sound a like" substitutions may have occurred due to the inherent limitations of voice recognition software    Read the chart carefully and recognize, using context, where substitutions have occurred

## 2020-06-30 NOTE — PROCEDURES
Temporary HD Catheter  Date/Time: 6/29/2020 11:10 AM  Performed by: Michael Mcelroy PA-C  Authorized by: Michael Mcelroy PA-C     Patient location:  Bedside  Other Assisting Provider: Yes (comment) Adel Decree DO)    Consent:     Consent obtained:  Written    Consent given by: daughter  Risks discussed:  Arterial puncture, incorrect placement, nerve damage, pneumothorax, infection and bleeding    Alternatives discussed:  No treatment, delayed treatment, alternative treatment, observation and referral  Universal protocol:     Patient identity confirmed: Anonymous protocol, patient vented/unresponsive  Pre-procedure details:     Hand hygiene: Hand hygiene performed prior to insertion      Sterile barrier technique: All elements of maximal sterile technique followed      Skin preparation:  ChloraPrep    Skin preparation agent: Skin preparation agent completely dried prior to procedure    Indications:     Central line indications: dialysis      Site selection rationale:  Anatomy unfavorable in RIJ  Anesthesia (see MAR for exact dosages): Anesthesia method:  Local infiltration    Local anesthetic:  Lidocaine 1% w/o epi  Procedure details:     Location:  Right femoral    Vessel type: vein      Approach: percutaneous technique used      Patient position:  Flat    Catheter type:  Triple lumen (Trialysis catheter)    Ultrasound guidance: yes      Sterile ultrasound techniques: Sterile gel and sterile probe covers were used      Number of attempts:  1    Successful placement: yes    Post-procedure details:     Post-procedure:  Dressing applied and line sutured    Assessment:  Blood return through all ports    Patient tolerance of procedure: Tolerated well, no immediate complications  Comments:      Poor flow when catheter inserted fully  Withdrew cathteter until able to withdraw blood from all ports - approximately 4 cm outside of insertion site

## 2020-06-30 NOTE — PROGRESS NOTES
NEPHROLOGY CVVH PROCEDURE NOTE    Seen and examined on CVVH  Unresponsive  Currently active continuous EEG monitoring  Remains on CVVH  Weaning Levophed    QB: 250  Dialysate: 1500  Ultrafiltration: even - 50/hour neg    Physical Exam:    Pulse 84   Temp (!) 96 8 °F (36 °C) (Probe)   Resp 18   Wt 35 6 kg (78 lb 7 7 oz)   SpO2 100%   BMI 16 98 kg/m²     General:  Unresponsive, remains intubated  CVS:  Regular  Lungs:  Coarse  Abdomen:  Soft  Access:  Temp dialysis catheter, noted right upper arm AV fistula positive bruit and thrill  Extremities:  Trace edema    ASSESSMENT/PLAN:  1  End-stage renal disease currently on CVVH, previously on maintenance hemodialysis although unknown schedule  2  Encephalopathy, possible anoxic injury, currently on continuous EEG monitoring  3  Post cardiac arrest   4  Shock/hypotension, wean hemodynamic support as tolerated, continue with oral midodrine  5  History of severe aortic stenosis  6  [place] bone disorder secondary CKD, continued CVVH  7   Anemia of CKD currently not on LIZANDRO treatment     PLAN:  · Overall electrolytes have continued to improve  · Continue with CVVH  · Wean hemodynamic support as tolerated

## 2020-06-30 NOTE — PLAN OF CARE
Problem: RESPIRATORY - ADULT  Goal: Achieves optimal ventilation and oxygenation  Description  INTERVENTIONS:  - Assess for changes in respiratory status  - Assess for changes in mentation and behavior  - Position to facilitate oxygenation and minimize respiratory effort  - Oxygen administered by appropriate delivery if ordered  - Initiate smoking cessation education as indicated  - Encourage broncho-pulmonary hygiene including cough, deep breathe, Incentive Spirometry  - Assess the need for suctioning and aspirate as needed  - Assess and instruct to report SOB or any respiratory difficulty  - Respiratory Therapy support as indicated  Outcome: Progressing     Problem: Prexisting or High Potential for Compromised Skin Integrity  Goal: Skin integrity is maintained or improved  Description  INTERVENTIONS:  - Identify patients at risk for skin breakdown  - Assess and monitor skin integrity  - Assess and monitor nutrition and hydration status  - Monitor labs   - Assess for incontinence   - Turn and reposition patient  - Assist with mobility/ambulation  - Relieve pressure over bony prominences  - Avoid friction and shearing  - Provide appropriate hygiene as needed including keeping skin clean and dry  - Evaluate need for skin moisturizer/barrier cream  - Collaborate with interdisciplinary team   - Patient/family teaching  - Consider wound care consult   Outcome: Progressing     Problem: CARDIOVASCULAR - ADULT  Goal: Maintains optimal cardiac output and hemodynamic stability  Description  INTERVENTIONS:  - Monitor I/O, vital signs and rhythm  - Monitor for S/S and trends of decreased cardiac output  - Administer and titrate ordered vasoactive medications to optimize hemodynamic stability  - Assess quality of pulses, skin color and temperature  - Assess for signs of decreased coronary artery perfusion  - Instruct patient to report change in severity of symptoms  Outcome: Progressing  Goal: Absence of cardiac dysrhythmias or at baseline rhythm  Description  INTERVENTIONS:  - Continuous cardiac monitoring, vital signs, obtain 12 lead EKG if ordered  - Administer antiarrhythmic and heart rate control medications as ordered  - Monitor electrolytes and administer replacement therapy as ordered  Outcome: Progressing     Problem: HEMATOLOGIC - ADULT  Goal: Maintains hematologic stability  Description  INTERVENTIONS  - Assess for signs and symptoms of bleeding or hemorrhage  - Monitor labs  - Administer supportive blood products/factors as ordered and appropriate  Outcome: Progressing     Problem: INFECTION - ADULT  Goal: Absence or prevention of progression during hospitalization  Description  INTERVENTIONS:  - Assess and monitor for signs and symptoms of infection  - Monitor lab/diagnostic results  - Monitor all insertion sites, i e  indwelling lines, tubes, and drains  - Monitor endotracheal if appropriate and nasal secretions for changes in amount and color  - [place] appropriate cooling/warming therapies per order  - Administer medications as ordered  - Instruct and encourage patient and family to use good hand hygiene technique  - Identify and instruct in appropriate isolation precautions for identified infection/condition  Outcome: Progressing     Problem: SAFETY ADULT  Goal: Patient will remain free of falls  Description  INTERVENTIONS:  - Assess patient frequently for physical needs  -  Identify cognitive and physical deficits and behaviors that affect risk of falls    -  [place] fall precautions as indicated by assessment   - Educate patient/family on patient safety including physical limitations  - Instruct patient to call for assistance with activity based on assessment  - Modify environment to reduce risk of injury  - Consider OT/PT consult to assist with strengthening/mobility  Outcome: Progressing  Goal: Maintain or return to baseline ADL function  Description  INTERVENTIONS:  -  Assess patient's ability to carry out ADLs; assess patient's baseline for ADL function and identify physical deficits which impact ability to perform ADLs (bathing, care of mouth/teeth, toileting, grooming, dressing, etc )  - Assess/evaluate cause of self-care deficits   - Assess range of motion  - Assess patient's mobility; develop plan if impaired  - Assess patient's need for assistive devices and provide as appropriate  - Encourage maximum independence but intervene and supervise when necessary  - Involve family in performance of ADLs  - Assess for home care needs following discharge   - Consider OT consult to assist with ADL evaluation and planning for discharge  - Provide patient education as appropriate  Outcome: Progressing  Goal: Maintain or return mobility status to optimal level  Description  INTERVENTIONS:  - Assess patient's baseline mobility status (ambulation, transfers, stairs, etc )    - Identify cognitive and physical deficits and behaviors that affect mobility  - Identify mobility aids required to assist with transfers and/or ambulation (gait belt, sit-to-stand, lift, walker, cane, etc )  - [place] fall precautions as indicated by assessment  - Record patient progress and toleration of activity level on Mobility SBAR; progress patient to next Phase/Stage  - Instruct patient to call for assistance with activity based on assessment  - Consider rehabilitation consult to assist with strengthening/weightbearing, etc   Outcome: Progressing     Problem: DISCHARGE PLANNING  Goal: Discharge to home or other facility with appropriate resources  Description  INTERVENTIONS:  - Identify barriers to discharge w/patient and caregiver  - Arrange for needed discharge resources and transportation as appropriate  - Identify discharge learning needs (meds, wound care, etc )  - Arrange for interpretive services to assist at discharge as needed  - Refer to Case Management Department for coordinating discharge planning if the patient needs post-hospital services based on physician/advanced practitioner order or complex needs related to functional status, cognitive ability, or social support system  Outcome: Progressing     Problem: Knowledge Deficit  Goal: Patient/family/caregiver demonstrates understanding of disease process, treatment plan, medications, and discharge instructions  Description  Complete learning assessment and assess knowledge base    Interventions:  - Provide teaching at level of understanding  - Provide teaching via preferred learning methods  Outcome: Progressing

## 2020-06-30 NOTE — RESPIRATORY THERAPY NOTE
RT Ventilator Management Note  [place] Degree 75 y o  female MRN: [id number]  Unit/Bed#: [facility] 656-27 Encounter: [id number]      Daily Screen       6/30/2020  0025 6/30/2020  0754          Patient safety screen outcome[de-identified]  Failed  Failed      Not Ready for Weaning due to[de-identified]  FiO2 >60%; Underline problem not resolved  Underline problem not resolved              Physical Exam:   Assessment Type: Assess only  General Appearance: Sedated  Respiratory Pattern: Assisted  Chest Assessment: Chest expansion symmetrical  Bilateral Breath Sounds: Coarse  O2 Device: vent      Resp Comments: pt remains on ac/vc settings at this time  will continue to monitor per protocol

## 2020-07-01 NOTE — PROGRESS NOTES
Critical care attending Progress Note - Critical Care   Macarena Miller 75 y o  female MRN: [id number]  Unit/Bed#: [facility] 516-01 Encounter: [id number]    Patient's chest x-ray this morning with left lung atelectasis  Plan for bronchoscopy this morning  Vitals:    07/01/20 0636 07/01/20 0700 07/01/20 0800 07/01/20 0814   Pulse: 94 84 80    Resp:  (!) 24 (!) 31    Temp:  98 6 °F (37 °C)     TempSrc:  Esophageal     SpO2:  (!) 89% 95% 97%   Weight:         GEN: unresponsive  HEENT: EOMI, PERRLA, MMM  CV: RRR,   Murmur  Resp:  CTA, no R/R/W  GI: soft,NT/ND  : no urinary catheter  Neuro:   Skin: warm, dry    Cardiac arrest x3 with return of spontaneous circulation  Acute hypoxic respiratory failure  Hypotension  Left lung atelectiasis  Acute encephalopathy likely anoxic brain injury  End-stage renal disease on hemodialysis  Aortic stenosis severe declined for TAVR [place]  Hypotension-resolved  Hepatitis-C  Transaminitis- due to hepatic hypoperfusion  Diabetes mellitus type 2  Thrombocytopenia  Anemia    This morning patient with left lung atelectasis  Plan for bronchoscopy  Oxygen saturation recorded at 89% this morning  Maintain on AC vent support  Not appropriate for SBT  Aggressive airway clearance protocol  Patient with poor neurologic prognosis  No seizure on EEG, poor activity on EEG  Plan for family discussion today regarding goals of care  NE for MAP >60  Patient has chronic hypotension on midodrine  CVVH -50, access site positional    Platelets decreased check with lab regarding clumping and hemolysis  Consider HIT  Update:  Patient's recheck CBC with platelet count of 9  Certain release assay sent for to rule out HI T  Argatroban initiated as patient has clotted off 3 CVVH filters  Patient's hemoglobin remains low and still requiring vasopressors for hemodynamic support  Transfuse 1 unit RBCs      Discussed patient prognosis with patient's daughter regarding poor neurologic status with no improvement in EEG and no improvement in clinical exam   Patient's daughter was informed that patient has severe anoxic brain injury secondary to cardiac arrest   Patient's past medical history with multiple comorbidities were addressed along with current acute events with cardiac arrest and neurologic dysfunction to establish goals of care  Patient's daughter states that she believes her mother would not want to be in a nursing facility with a tracheostomy and a feeding tube  At this time continue with DNR level 2  Patient's daughter plans to discuss with her children and additional family  Meeting with patient's daughter tomorrow to discuss goals of care and plan for transition to comfort care        Critical care time 46 minutes

## 2020-07-01 NOTE — CONSULTS
Consult Note - Wound   Nayana Arechiga 75 y o  female MRN: [id number]  Unit/Bed#: [facility] 684-03 Encounter: [id number]      History and Present Illness:  Patient is seen for wound care exam  Patient examined in bed and is dependent with repositioning  Primary nurse present during exam       Assessment Findings:   1  POA evolving DTI on sacrum likely a stage 3 or 4 pressure injury        No other skin issues noted    See flowsheets for details        Skin care Plan:  1-Protect sacrum w/Allevyn foam, celine with T, change q3d and PRN, check skin q-shift  2-Turn/reposition q2h or when medically stable for pressure re-distribution on skin   3-Elevate heels to offload pressure]  4-Moisturize skin daily with skin nourishing cream  5-Ehob cushion in chair when out of bed  6-Hydraguard to bilateral heels BID and PRN  Call or tigertext with any questions  Wound Care will continue to follow    Wound 06/30/20 Pressure Injury Sacrum Mid (Active)   Wound Image   7/1/2020 11:12 AM   Wound Description Clean;Dry;Fragile;Light purple;Pink 7/1/2020 11:12 AM   Staging Deep Tissue Injury 7/1/2020 11:12 AM   Antonia-wound Assessment Clean;Dry;Fragile 7/1/2020 11:12 AM   Wound Length (cm) 1 5 cm 7/1/2020 11:12 AM   Wound Width (cm) 1 5 cm 7/1/2020 11:12 AM   Wound Depth (cm) 0 1 7/1/2020 11:12 AM   Calculated Wound Area (cm^2) 2 25 cm^2 7/1/2020 11:12 AM   Calculated Wound Volume (cm^3) 0 22 cm^3 7/1/2020 11:12 AM   Drainage Amount Scant 7/1/2020  8:00 AM   Drainage Description Serous 7/1/2020  8:00 AM   Treatments Elevated;Cleansed; Other (Comment) 7/1/2020  8:00 AM   Dressing Foam, Silicon (eg  Allevyn, etc); Other (Comment) 7/1/2020 11:12 AM   Wound packed? No 6/30/2020  8:00 PM   Dressing Status Clean;Dry; Intact 7/1/2020  8:00 AM

## 2020-07-01 NOTE — RESPIRATORY THERAPY NOTE
RT Ventilator Management Note  Farzana Lozano 75 y o  female MRN: [id number]  Unit/Bed#: [facility] 516-01 Encounter: [id number]      Daily Screen       6/30/2020  0754 7/1/2020  0814          Patient safety screen outcome[de-identified]  Failed  Failed      Not Ready for Weaning due to[de-identified]  Underline problem not resolved  Underline problem not resolved              Physical Exam:   Assessment Type: (P) Assess only  General Appearance: (P) Sedated  Respiratory Pattern: (P) Assisted  Chest Assessment: (P) Chest expansion symmetrical  Bilateral Breath Sounds: (P) Clear  O2 Device:        Resp Comments: (P) pt remains on ac/vc settings no changes made at this time  pt tolerating, will continue to monitor per protocol

## 2020-07-01 NOTE — RESPIRATORY THERAPY NOTE
RT Ventilator Management Note  Layne Casiano 75 y o  female MRN: [id number]  Unit/Bed#: [facility] 774-33 Encounter: [id number]      Daily Screen       6/30/2020  0025 6/30/2020  0754          Patient safety screen outcome[de-identified]  Failed  Failed      Not Ready for Weaning due to[de-identified]  FiO2 >60%; Underline problem not resolved  Underline problem not resolved              Physical Exam:   O2 Device: (P)        Resp Comments: (P) Pt appears to be okay on the current vent setting, will cont' to monitor per protocol

## 2020-07-01 NOTE — PROGRESS NOTES
NEPHROLOGY CVVH PROCEDURE NOTE    Seen and examined on CVVH  Patient remains unresponsive  Weaning hemodynamic support as tolerated, tolerating CVVH, -50 cc/hour    QB: 250  Dialysate: 1 5 liter/ hour 4 K  Ultrafiltration: 50 - / hour    Physical Exam:    Pulse 80   Temp 98 6 °F (37 °C) (Esophageal)   Resp (!) 31   Wt 41 1 kg (90 lb 9 7 oz)   SpO2 97%   BMI 19 61 kg/m²     General:  Unresponsive, remains intubated  CVS:  Regular  Lungs:  Decreased at bases  Abdomen:  Soft  Access:  Temp dialysis catheter  Extremities:  2+ edema  Neuro:  Unresponsive    ASSESSMENT/PLAN:  1  End-stage renal disease currently on CVVH, previously on maintenance hemodialysis although unknown schedule  2  Encephalopathy, possible anoxic injury, currently on continuous EEG monitoring  3  Post cardiac arrest   4  Shock/hypotension, wean hemodynamic support as tolerated, continue with oral midodrine  5  History of severe aortic stenosis  6  Mineral bone disorder secondary CKD, continued CVVH  7  Anemia of CKD currently not on LIZANDRO treatmen    · Continue with CVVH for now, -50 cc per  · Wean hemodynamic support as tolerated  · Likely family discussion regards to goals of care

## 2020-07-01 NOTE — RESPIRATORY THERAPY NOTE
Bronchoscopy done at bedside  No complications  Pt returned to previous vent settings of AC/VC 16/300/+8 50%

## 2020-07-01 NOTE — PROGRESS NOTES
Daily Progress Note - Critical Care   Dylan Ferguson 75 y o  female MRN: [id number]  Unit/Bed#: [facility] 516-01 Encounter: [id number]        ----------------------------------------------------------------------------------------  HPI/24hr events: Left lung white out on CXR this morning, suspect acute mucus plugging of bronchi  Continue on Levo 6mcg  cEEG with no seizure activity, however shows diffuse slowing consistent with anoxic brain injury  Remains GCS 3T,+cough/gag, but no corneals  Family meeting to discuss goals of care with patient's daughter planned for 1600 today       ---------------------------------------------------------------------------------------  SUBJECTIVE    Review of Systems  Review of systems was unable to be performed secondary to encephalopathy  ---------------------------------------------------------------------------------------  Assessment and Plan:    Neuro:   · Diagnosis: Acute encephalopathy   ? Likely secondary to anoxic brain injury in setting of cardiac arrest with prolonged downtime  ? Completed TTM, rewarmed for 24 hours   ? Hold all sedating agents   ? cEEG negative for seizure activity, continuous diffuse background suppression findings are consistent with severe diffuse cerebral dysfunction, specifically anoxic encephalopathy with post-anoxic myoclonus     CV:   · Diagnosis: PEA aardiac arrest, severe aortic stenosis, mitral stenosis, diastolic dysfunction   ? Unclear etiology of cardiac arrest-respiratory in origin vs metabolic derangements from missed   ? Echo 6/29: EF 55-60%  [facility]  Mild to Mod MR  Mod to severe AS  ? Per chart review patient evaluated by CT surgery at [facility] and was refused as she was deemed too high risk for TAVR  · Diagnosis: Non MI troponin elevation  ? Demand ischemia secondary to cardiac arrest   ? EKG with non specific ST changes   ? Troponin peak 9 3  · Diagnosis:  Hypotension-acute on chronic   ? Continue midodrine 10mg TID  ?  Wean Levophed for MAPgreater than 65     Pulm:  · Diagnosis:  Acute on chronic respiratory failure with hypoxia (wears 2L O2 chronically at home)   ? Pulmonary edema noted on presenting chest x-ray likely secondary to lack of dialysis  Continue volume removal with CVVH  ? CXR this morning with left lung white out from suspected mucus plug, bronch planned for this morning  ? Current vent settings AC VC 16/350/60%/8 wean FiO2 as able her SpO2 greater than 92%  ? Chlorhexidine, ppi, head of bed > 30°     GI:   · Diagnosis:  Hep C/transaminitis   ? No evidence of decompensated liver failure at this time  ? Monitor LFTs     :   · Diagnosis:  ESRD on dialysis  ? Plan:  Continue CVVH, running -50mL/hr  ? Bladder scan q shift     F/E/N:   · Plan: Start tube feeding, monitor electrolytes, volume removal with CVVH as above         Heme/Onc:   · Diagnosis: Thrombocytopenia  ? Acute drop in platelets 693--30--89  ? D/C SQH  ? Send hemolysis smear, LDH, haptoglobin  ? Consider sending HIT panel  · Diagnosis: Anemia  ? Unclear etiology, no signs of active bleeding  ? CVVH filter down x 1 however blood returned   ? Repeat hgb this afternoon, transfuse for <7         Endo:   · Diagnosis:Type II DM   ? Plan: A1c 5 2, diet controlled per daughter  POC glucose q6h, no coverage nneded     ID:   · No active isuses, trend temps and WBC     MSK/Skin:   · Turn and reposition to off load pressure points      Disposition: Continue Critical Care   Code Status: Level 2 - DNAR: but accepts endotracheal intubation  ---------------------------------------------------------------------------------------  ICU CORE MEASURES    Prophylaxis   VTE Pharmacologic Prophylaxis: Pharmacologic VTE Prophylaxis contraindicated due to thrombocytopenia  VTE Mechanical Prophylaxis: sequential compression device  Stress Ulcer Prophylaxis: Omeprazole PO    ABCDE Protocol (if indicated)  Plan to perform spontaneous awakening trial today?  Not applicable  Plan to perform spontaneous breathing trial today? Yes  Obvious barriers to extubation? Yes  CAM-ICU: unable to perform    Invasive Devices Review  Invasive Devices     Central Venous Catheter Line            CVC Central Lines 20 1 day          Arterial Line            Arterial Line 20 Left Femoral 1 day          Line            Hemodialysis AV Fistula 20 Left Upper arm 1 day    Hemodialysis AV Fistula 20 Right Upper arm 1 day          Hemodialysis Catheter            HD Temporary Double Catheter 2 days    Hemodialysis Catheter Femoral 2 days          Drain            NG/OG Tube Orogastric Left mouth 1 day          Airway            ETT  Cuffed 7 mm 2 days              Can any invasive devices be discontinued today?  No  ---------------------------------------------------------------------------------------  OBJECTIVE    Vitals   Vitals:    20 1000 20 1100 20 1200 20 1300   Pulse: 82 92  86   Resp: 19 (!) 23  20   Temp: 98 5 °F (36 9 °C)  98 5 °F (36 9 °C)    TempSrc: Esophageal  Esophageal    SpO2: 99% (!) 61%  92%   Weight:         Temp (24hrs), Av 5 °F (36 9 °C), Min:96 8 °F (36 °C), Max:99 1 °F (37 3 °C)  Current: Temperature: 98 5 °F (36 9 °C)    Respiratory:  SpO2: SpO2: 100 %, SpO2 Activity: SpO2 Activity: At Rest, SpO2 Device: O2 Device: Ventilator       Invasive/non-invasive ventilation settings   Respiratory    Lab Data (Last 4 hours)    None         O2/Vent Data (Last 4 hours)       1127           Vent Mode AC/VC       Resp Rate (BPM) (BPM) 16       Vt (mL) (mL) 300       FIO2 (%) (%) 50       PEEP (cmH2O) (cmH2O) 8       MV 6 86                   Physical Exam      Laboratory and Diagnostics:  Results from last 7 days   Lab Units 20  1409 20  0537 20  0559 20  1745 20  1505 20  1350 20  1143 20  0751   WBC Thousand/uL 5 53 8 00 5 41  --   --   --   --  7 03   HEMOGLOBIN g/dL 7 5* 8 4* 9 2* 11 6  --   -- --  10 3*   I STAT HEMOGLOBIN g/dl  --   --   --   --  10 9*  --  11 9  --    HEMATOCRIT % 24 3* 27 2* 28 4* 36 1  --   --   --  34 8   HEMATOCRIT, ISTAT %  --   --   --   --  32*  --  35  --    PLATELETS Thousands/uL 10* 32* 60*  --   --  208  --  146*   MONO PCT %  --   --   --   --   --   --   --  4     Results from last 7 days   Lab Units 07/01/20  1137 07/01/20  0537 06/30/20  2354 06/30/20  1725 06/30/20  1120 06/30/20  0559 06/29/20  2233  06/29/20  0751   SODIUM mmol/L 139 137 141 142 142 147* 149*   < > 157*   POTASSIUM mmol/L 5 3 4 9 4 9 5 0 6 2* 3 8 3 5   < > 4 4   CHLORIDE mmol/L 109* 105 108 108 107 108 108   < > 112*   CO2 mmol/L 23 23 24 24 25 28 28   < > 32   CO2, I-STAT   --   --   --   --   --   --   --    < >  --    ANION GAP mmol/L 7 9 9 10 10 11 13   < > 13   BUN mg/dL 32* 33* 38* 44* 55* 66* 84*   < > 90*   CREATININE mg/dL 2 88* 3 07* 3 55* 4 31* 5 36* 6 64* 8 75*   < > 10 41*   CALCIUM mg/dL 9 2 9 6 9 6 9 7 11 2* 10 2* 10 1   < > 14 3*   GLUCOSE RANDOM mg/dL 138 119 120 139 100 82 79   < > 318*   ALT U/L  --  56  --   --   --  75  --   --  52   AST U/L  --  91*  --   --   --  114*  --   --  57*   ALK PHOS U/L  --  198*  --   --   --  199*  --   --  197*   ALBUMIN g/dL  --  2 8*  --   --   --  2 9*  --   --  2 9*   TOTAL BILIRUBIN mg/dL  --  0 58  --   --   --  0 81  --   --  0 30    < > = values in this interval not displayed       Results from last 7 days   Lab Units 07/01/20 1137 07/01/20  0537 06/30/20  2354 06/30/20  1725 06/30/20  1120 06/30/20  0559 06/29/20  2233   MAGNESIUM mg/dL 2 0 2 1 2 1 2 2 2 4 2 1 2 3   PHOSPHORUS mg/dL 4 3* 4 1 3 9 3 3 3 4 3 7 4 5*      Results from last 7 days   Lab Units 07/01/20  1137   INR  1 44*      Results from last 7 days   Lab Units 06/30/20  0139 06/29/20  2233 06/29/20  1745 06/29/20  1350 06/29/20  0751   TROPONIN I ng/mL 8 63* 9 31* 5 79* 3 02* 0 03     Results from last 7 days   Lab Units 06/29/20  1350 06/29/20  0946 06/29/20  0751   LACTIC ACID mmol/L 1 6 5 9* 12 4*     ABG:  Results from last 7 days   Lab Units 07/01/20  0538   PH ART  7 421   PCO2 ART mm Hg 36 0   PO2 ART mm Hg 101 9   HCO3 ART mmol/L 22 9   BASE EXC ART mmol/L -1 3   ABG SOURCE  Line, Arterial     VBG:  Results from last 7 days   Lab Units 07/01/20  0538   ABG SOURCE  Line, Arterial           Micro  Results from last 7 days   Lab Units 07/01/20  0956 06/29/20  1119   GRAM STAIN RESULT  2+ Polys*  2+ Gram positive cocci in pairs*  1+ Gram negative rods*  --    MRSA CULTURE ONLY   --  No Methicillin Resistant Staphlyococcus aureus (MRSA) isolated     Imaging: CXR-consolidation and partial collapse of left lung, likely secondary to acute mucus plugging of bronchi I have personally reviewed pertinent reports  and I have personally reviewed pertinent films in PACS    Intake and Output  I/O       06/29 0701 - 06/30 0700 06/30 0701 - 07/01 0700 07/01 0701 - 07/02 0700    I V  (mL/kg) 92 8 (2 6) 336 7 (8 2) 214 7 (5 2)    NG/GT 0 0 0    IV Piggyback 100 250 7 10    Total Intake(mL/kg) 192 8 (5 4) 587 4 (14 3) 224 7 (5 5)    Urine (mL/kg/hr)  0 (0) 0 (0)    Emesis/NG output 250 200 80    Other 234 1707 406    Total Output 484 1907 486    Net -291 2 -1319 7 -261 3                 Height and Weights      IBW: -92 5 kg  Body mass index is 19 61 kg/m²  Weight (last 2 days)     Date/Time   Weight    07/01/20 0600   41 1 (90 61)    06/30/20 0600   35 6 (78 48)                Nutrition       Diet Orders   (From admission, onward)             Start     Ordered    06/29/20 2215  Diet NPO  Diet effective now     Question Answer Comment   Diet Type NPO    RD to adjust diet per protocol?  Yes        06/29/20 2214              Active Medications  Scheduled Meds:  Current Facility-Administered Medications:  acetaminophen 975 mg Oral Q6H Rigo Donaldson PA-C    alteplase 2 mg Intracatheter Once MIKE Giraldo    alteplase 2 mg Intracatheter Once MIKE Giraldo    argatroban 0 05-3 mcg/kg/min Intravenous Titrated Maryellen Spironello V, CRNP    atorvastatin 40 mg Oral Daily With [facility]    chlorhexidine 15 mL Swish & Spit Q12H [facility] Rubin Gallardo PA-C    LORazepam 2 mg Intravenous Q4H PRN Rubin Gallardo PA-C    midodrine 10 mg Oral TID Rubin Gallardo PA-C    nicotine 7 mg Transdermal Daily Rubin Galalrdo PA-C    norepinephrine 1-30 mcg/min Intravenous Titrated Maryellen Spironello V, CRNP Last Rate: 6 mcg/min (07/01/20 1339)   NxStage K 4/Ca 3 20,000 mL Dialysis Continuous Suzanne Monsivais DO Last Rate: 0 mL (06/30/20 1140)   omeprazole (PRILOSEC) suspension 2 mg/mL 20 mg Oral Daily Hailey Padron PA-C      Continuous Infusions:    argatroban 0 05-3 mcg/kg/min    norepinephrine 1-30 mcg/min Last Rate: 6 mcg/min (07/01/20 1339)   NxStage K 4/Ca 3 20,000 mL Last Rate: 0 mL (06/30/20 1140)     PRN Meds:     LORazepam 2 mg Q4H PRN       Allergies   No Known Allergies  ---------------------------------------------------------------------------------------  Advance Directive and Living Will:    none  Power of :  Carie Rae (daughter)  POLST:  none   ---------------------------------------------------------------------------------------  Care Time Delivered:   Upon my evaluation, this patient had a high probability of imminent or life-threatening deterioration due to anoxic brain injury, thrombocytopenia, ESRD on CVVH, which required my direct attention, intervention, and personal management  I have personally provided 25 minutes (0915 to 0940) of critical care time, exclusive of procedures, teaching, family meetings, and any prior time recorded by providers other than myself  MIKE Morrison      Portions of the record may have been created with voice recognition software  Occasional wrong word or "sound a like" substitutions may have occurred due to the inherent limitations of voice recognition software    Read the chart carefully and recognize, using context, where substitutions have occurred

## 2020-07-02 NOTE — PROGRESS NOTES
Notified by nursing that patient had acute episode of hypotension with SBP 60s that was associated with tachypnea and vent dyssynchrony  Arrived at patient's bedside, Levophed had been increased to 8 mcg and blood pressure had stabilized  Patient remained dyssynchronous with the vent and tachypneic with respiratory rate 40-60  I-STAT ABG and CXR obtained with no significant abnormalities  Patient given 100 mcgs IV fentanyl with improvement in ventilator dyssynchrony  Daughter Vicky Castellano called up and updated on the above events, as well as patient's continued poor neuro prognosis, she will be in later this evening      Critical care time:  25 minutes

## 2020-07-02 NOTE — RESPIRATORY THERAPY NOTE
RT Ventilator Management Note  Mabel Joy 75 y o  female MRN: [id number]  Unit/Bed#: [facility] 516-01 Encounter: [id number]      Daily Screen       7/1/2020  0814 7/2/2020  0746          Patient safety screen outcome[de-identified]  Failed  Failed      Not Ready for Weaning due to[de-identified]  Underline problem not resolved  Underline problem not resolved              Physical Exam:   Assessment Type: (P) Assess only  General Appearance: (P) Eyes do not open to any stimulus, Sedated  Respiratory Pattern: (P) Assisted  Chest Assessment: (P) Chest expansion symmetrical  Bilateral Breath Sounds: (P) Coarse  L Breath Sounds: Coarse  Suction: (P) ET Tube  O2 Device: (P) vent      Resp Comments: (P) no weaning attempted, pt unstable, will continue to monitor

## 2020-07-02 NOTE — RESPIRATORY THERAPY NOTE
RT Ventilator Management Note  Lenell Line 75 y o  female MRN: [id number]  Unit/Bed#: [facility] 516-01 Encounter: [id number]      Daily Screen       7/1/2020  0814 7/2/2020  0746          Patient safety screen outcome[de-identified]  Failed  Failed      Not Ready for Weaning due to[de-identified]  Underline problem not resolved  Underline problem not resolved              Physical Exam:   Assessment Type: Assess only  General Appearance: Eyes do not open to any stimulus, Sedated  Respiratory Pattern: Assisted  Chest Assessment: Chest expansion symmetrical  Bilateral Breath Sounds: Coarse  Suction: ET Tube  O2 Device: vent      Resp Comments: (P) no vent changes today, pt appears comfortable on current settings, will continue to [employer]

## 2020-07-02 NOTE — NURSING NOTE
Unable to obtain accurate O2 Saturations due to poor perfusion  Spoke with Virgilio MCKEON regarding this  ABG ordered

## 2020-07-02 NOTE — PROGRESS NOTES
NEPHROLOGY CVVH PROCEDURE NOTE    Seen and examined on CVVH  Remains unresponsive  Some issues with CVVH over the evening hours including alarms as well as increasing venous and arterial pressures  QB: 250  Dialysate: 1 5 liter/hour 4 K  Ultrafiltration: 50 / hour    Physical Exam:    BP 98/53   Pulse 78   Temp 98 6 °F (37 °C)   Resp 20   Wt 41 1 kg (90 lb 9 7 oz)   SpO2 99%   BMI 19 61 kg/m²     General:  Unresponsive, remains vent dependent  CVS:  Regular  Lungs:  Clear to auscultation  Abdomen:  Distended, soft  Access:  Temp dialysis catheter, right upper extremity AV fistula  Extremities:  Trace edema    ASSESSMENT/PLAN:  1  End-stage renal disease currently on CVVH, previously on maintenance hemodialysis although unknown schedule  2  Encephalopathy, possible anoxic injury, currently on continuous EEG monitoring  3  Post cardiac arrest   4  Shock/hypotension, wean hemodynamic support as tolerated, continue with oral midodrine  5  History of severe aortic stenosis  6  Mineral bone disorder secondary CKD, continued CVVH  7   Anemia of CKD currently not on LIZANDRO treatment      Plan:   Overall creatinine, urea and electrolytes significantly improved discontinue CVVH   Evaluate for intermittent hemodialysis next 24-48 hours   Ongoing family discussions in regards to goals of care

## 2020-07-02 NOTE — PROGRESS NOTES
Daily Progress Note - Critical Care   Farzana Lozano 75 y o  female MRN: [id number]  Unit/Bed#: [facility] 516-01 Encounter: [id number]        ----------------------------------------------------------------------------------------  HPI/24hr events: Platelets dropped to 9,000 yesterday, rapid hit and natalia sat  Patient started on argatroban  Platelets improved to 14,000 this morning  Hemoglobin did drop to 7 5 yesterday afternoon, no active signs of bleeding  Patient given 1 unit PRBCs with improvement in hemoglobin to 9 1  No improvement in neuro status, patient remains GCS 3T  Patient continues to require low dose Levo 2-3mcg overnight    Ongoing goals of care discussions with patient's daughter, at this time patient's daughter wishes for her mother to remain full code     ---------------------------------------------------------------------------------------  SUBJECTIVE    Review of Systems   Unable to perform ROS: Mental status change     Review of systems was unable to be performed secondary to encephalopathy  ---------------------------------------------------------------------------------------  Assessment and Plan:    Neuro:   · Diagnosis: Acute encephalopathy   ? Likely secondary to anoxic brain injury in setting of cardiac arrest with prolonged downtime  ? Completed TTM  ? Continue routine neuro checks   ? cEEG negative for seizure activity, continuous diffuse background suppression findings are consistent with severe diffuse cerebral dysfunction, specifically anoxic encephalopathy with post-anoxic myoclonus  ? D/C today     CV:   · Diagnosis: PEA aardiac arrest, severe aortic stenosis, mitral stenosis, diastolic dysfunction   ? Unclear etiology of cardiac arrest-respiratory in origin vs metabolic derangements from missed   ? Echo 6/29: EF 55-60%  [facility]  Mild to Mod MR  Mod to severe AS  ?  Per chart review patient evaluated by CT surgery at [facility] and was refused as she was deemed too high risk for TAVR  · Diagnosis: Non MI troponin elevation  ? Demand ischemia secondary to cardiac arrest   ? EKG with non specific ST changes   ? Troponin peak 9 3  · Diagnosis:  Hypotension-acute on chronic   ? Increase midodrine 15mg TID  ? Wean Levophed for MAP greater than 60     Pulm:  · Diagnosis:  Acute on chronic respiratory failure with hypoxia (wears 2L O2 chronically at home)   ? S/P bronch yesterday secondary to acute mucus plugging of left mainstem bronchi, post CXR with improved aeration of left lung  ? Current vent settings AC VC 16/300/40%/5   ? SBT planned this morning, mental status precludes extubation  ? Chlorhexidine, ppi, head of bed > 30°     GI:   · Diagnosis: Hep C/transaminitis   ? No evidence of decompensated liver failure at this time  ? Monitor LFTs     :   · Diagnosis:  ESRD on dialysis  ? Plan: Continue CVVH, running -50mL/hr  ? Bladder scan q shift  ? Discussed with nephro, plan to d/c CVVH this morning and start iHD     F/E/N:   · Plan: Continue TF-Jevity 1 2 @ goal 50, monitor electrolytes        Heme/Onc:   · Diagnosis: Thrombocytopenia  ? Acute drop in platelets 960--51--47--8--41  ? SQH D/C yesterday  ? hemolysis smear with + schistocytes and helmet cells  ? Rapid HIT and ELOY pending  · Diagnosis: Anemia  ? Unclear etiology, no signs of active bleeding  ? Transfuse 1 unit PRBC yesterday for Hgb 7 5, now stable at 9 1        Endo:   · Diagnosis:Type II DM   ? Plan: A1c 5 2, diet controlled per daughter   POC glucose q6h, no coverage nneded     ID:   · No active isuses, trend temps and WBC  · Bronch culture/gram stain with 2+ GPC and 1+ GNR     MSK/Skin:   · Turn and reposition to off load pressure points    Disposition: Continue Critical Care   Code Status: Level 2 - DNAR: but accepts endotracheal intubation  ---------------------------------------------------------------------------------------  ICU CORE MEASURES    Prophylaxis   VTE Pharmacologic Prophylaxis: Argatroban  VTE Mechanical Prophylaxis: sequential compression device  Stress Ulcer Prophylaxis: Omeprazole PO    ABCDE Protocol (if indicated)  Plan to perform spontaneous awakening trial today? Yes  Plan to perform spontaneous breathing trial today? Yes  Obvious barriers to extubation? Yes  CAM-ICU:  Unable to obtain    Invasive Devices Review  Invasive Devices     Arterial Line            Arterial Line 20 Left Femoral 2 days          Line            Hemodialysis AV Fistula 20 Left Upper arm 2 days    Hemodialysis AV Fistula 20 Right Upper arm 2 days          Hemodialysis Catheter            HD Temporary Double Catheter 3 days    Hemodialysis Catheter Femoral 3 days          Drain            NG/OG Tube Orogastric Left mouth 2 days    NG/OG/Enteral Tube 2 days          Airway            ETT  Cuffed 7 mm 3 days              Can any invasive devices be discontinued today? No  ---------------------------------------------------------------------------------------  OBJECTIVE    Vitals   Vitals:    20 0900 20 1000 20 1055 20 1100   BP:   98/53    Pulse: 82 76 78    Resp: (!) 9 16 20    Temp:    99 5 °F (37 5 °C)   TempSrc:    Probe   SpO2: 95% 99%     Weight:         Temp (24hrs), Av 7 °F (37 1 °C), Min:97 8 °F (36 6 °C), Max:99 5 °F (37 5 °C)  Current: Temperature: 99 5 °F (37 5 °C)      Respiratory:  SpO2: SpO2: (!) 75 %  , SpO2 Activity: SpO2 Activity: At Rest, SpO2 Device: O2 Device: Ventilator       Invasive/non-invasive ventilation settings   Respiratory    Lab Data (Last 4 hours)    None         O2/Vent Data (Last 4 hours)       1140           Vent Mode AC/VC       Resp Rate (BPM) (BPM) 16       Vt (mL) (mL) 300       FIO2 (%) (%) 40       PEEP (cmH2O) (cmH2O) 5       MV 7 7                   Physical Exam   Constitutional: She appears well-developed and well-nourished  HENT:   Head: Normocephalic and atraumatic     Eyes: Conjunctivae and lids are normal    Roving eye movements, no pupillary response to light   Neck: Trachea normal  Neck supple  Cardiovascular: Normal rate, regular rhythm and intact distal pulses  Murmur heard  Systolic murmur is present with a grade of 3/6  Pulses:       Radial pulses are 1+ on the right side, and 1+ on the left side  Dorsalis pedis pulses are 1+ on the right side, and 1+ on the left side  Pulmonary/Chest: Effort normal  She has decreased breath sounds  Thick tan secretions from ETT   Abdominal: Soft  Normal appearance and bowel sounds are normal    Genitourinary:   Genitourinary Comments: anuric   Musculoskeletal:   No extremity movement noted   Neurological: She is unresponsive  GCS eye subscore is 1  GCS verbal subscore is 1  GCS motor subscore is 1  Positive cough reflex, negative gag, no pupillary response to light, no corneal reflex present  Patient does have spontaneous respirations over the ventilator  Does not have any extremity movement to noxious stimuli  Skin: Skin is dry and intact  Capillary refill takes less than 2 seconds  Cool peripheral extremities          Laboratory and Diagnostics:  Results from last 7 days   Lab Units 07/02/20  0526 07/01/20 2335 07/01/20  1409 07/01/20  0537 06/30/20  0559 06/29/20  1745 06/29/20  1505 06/29/20  1350 06/29/20  1143  06/29/20  0751   WBC Thousand/uL 5 90  --  5 53 8 00 5 41  --   --   --   --   --  7 03   HEMOGLOBIN g/dL 9 0* 9 1* 7 5* 8 4* 9 2* 11 6  --   --   --   --  10 3*   I STAT HEMOGLOBIN g/dl  --   --   --   --   --   --  10 9*  --  11 9   < >  --    HEMATOCRIT % 28 3*  --  24 3* 27 2* 28 4* 36 1  --   --   --   --  34 8   HEMATOCRIT, ISTAT %  --   --   --   --   --   --  32*  --  35  --   --    PLATELETS Thousands/uL 14*  --  10* 32* 60*  --   --  208  --   --  146*   MONO PCT %  --   --   --   --   --   --   --   --   --   --  4    < > = values in this interval not displayed       Results from last 7 days   Lab Units 07/02/20  0526 07/01/20 2335 07/01/20  1750 07/01/20  1137 07/01/20  0537 06/30/20  2354 06/30/20  1725  06/30/20  0559  06/29/20  0751   SODIUM mmol/L 136 135* 138 139 137 141 142   < > 147*   < > 157*   POTASSIUM mmol/L 4 6 5 0 4 7 5 3 4 9 4 9 5 0   < > 3 8   < > 4 4   CHLORIDE mmol/L 106 106 107 109* 105 108 108   < > 108   < > 112*   CO2 mmol/L 23 24 23 23 23 24 24   < > 28   < > 32   CO2, I-STAT   --   --   --   --   --   --   --   --   --    < >  --    ANION GAP mmol/L 7 5 8 7 9 9 10   < > 11   < > 13   BUN mg/dL 22 27* 27* 32* 33* 38* 44*   < > 66*   < > 90*   CREATININE mg/dL 1 98* 2 27* 2 31* 2 88* 3 07* 3 55* 4 31*   < > 6 64*   < > 10 41*   CALCIUM mg/dL 8 9 8 9 9 0 9 2 9 6 9 6 9 7   < > 10 2*   < > 14 3*   GLUCOSE RANDOM mg/dL 125 114 151* 138 119 120 139   < > 82   < > 318*   ALT U/L 42  --   --   --  56  --   --   --  75  --  52   AST U/L 105*  --   --   --  91*  --   --   --  114*  --  57*   ALK PHOS U/L 198*  --   --   --  198*  --   --   --  199*  --  197*   ALBUMIN g/dL 2 6*  --   --   --  2 8*  --   --   --  2 9*  --  2 9*   TOTAL BILIRUBIN mg/dL 0 84  --   --   --  0 58  --   --   --  0 81  --  0 30    < > = values in this interval not displayed       Results from last 7 days   Lab Units 07/02/20 0526 07/01/20  2335 07/01/20  1754 07/01/20  1137 07/01/20  0537 06/30/20  2354 06/30/20  1725   MAGNESIUM mg/dL 2 1 2 0 2 0 2 0 2 1 2 1 2 2   PHOSPHORUS mg/dL 2 8 3 6 3 7 4 3* 4 1 3 9 3 3      Results from last 7 days   Lab Units 07/02/20  0826 07/02/20  0413 07/01/20  2335 07/01/20  1754 07/01/20  1414 07/01/20  1137   INR   --   --   --   --   --  1 44*   PTT seconds 68* 66* 49* 48* 42*  --       Results from last 7 days   Lab Units 06/30/20  0139 06/29/20  2233 06/29/20  1745 06/29/20  1350 06/29/20  0751   TROPONIN I ng/mL 8 63* 9 31* 5 79* 3 02* 0 03     Results from last 7 days   Lab Units 06/29/20  1350 06/29/20  0946 06/29/20  0751   LACTIC ACID mmol/L 1 6 5 9* 12 4*     ABG:  Results from last 7 days   Lab Units 07/01/20  0538   PH ART 7 421   PCO2 ART mm Hg 36 0   PO2 ART mm Hg 101 9   HCO3 ART mmol/L 22 9   BASE EXC ART mmol/L -1 3   ABG SOURCE  Line, Arterial     VBG:  Results from last 7 days   Lab Units 07/01/20  0538   ABG SOURCE  Line, Arterial           Micro  Results from last 7 days   Lab Units 07/01/20  0956 06/29/20  1119   SPUTUM CULTURE  Culture too young- will reincubate  --    GRAM STAIN RESULT  2+ Polys*  2+ Gram positive cocci in pairs*  1+ Gram negative rods*  --    MRSA CULTURE ONLY   --  No Methicillin Resistant Staphlyococcus aureus (MRSA) isolated       Intake and Output  I/O       06/30 0701 - 07/01 0700 07/01 0701 - 07/02 0700 07/02 0701 - 07/03 0700    I V  (mL/kg) 336 7 (8 2) 351 4 (8 6) 125 3 (3)    Blood  350     NG/GT 0 126 140    IV Piggyback 250 7 277 5     Feedings  8 83    Total Intake(mL/kg) 587 4 (14 3) 1112 9 (27 1) 348 3 (8 5)    Urine (mL/kg/hr) 0 (0) 0 (0) 0 (0)    Emesis/NG output 200 110     Other 1707 1895 142    Total Output 1907 2005 142    Net -1319 7 -892 1 +206 3               Height and Weights      IBW: -92 5 kg  Body mass index is 19 61 kg/m²  Weight (last 2 days)     Date/Time   Weight    07/01/20 0600   41 1 (90 61)    06/30/20 0600   35 6 (78 48)                Nutrition       Diet Orders   (From admission, onward)             Start     Ordered    07/01/20 1916  Diet Enteral/Parenteral; Tube Feeding No Oral Diet; Jevity 1 2 Marcin; Continuous; 50  Diet effective now     Question Answer Comment   Diet Type Enteral/Parenteral    Enteral/Parenteral Tube Feeding No Oral Diet    Tube Feeding Formula: Jevity 1 2 Marcin    Bolus/Cyclic/Continuous Continuous    Tube Feeding Goal Rate (mL/hr): 50    RD to adjust diet per protocol?  Yes        07/01/20 1916              Active Medications  Scheduled Meds:  Current Facility-Administered Medications:  acetaminophen 975 mg Oral Q6H Morris Donaldson PA-C    alteplase 2 mg Intracatheter Once MIKE Giraldo    alteplase 2 mg Intracatheter Once July Hum, CRNP    argatroban 0 05-3 mcg/kg/min Intravenous Titrated Maryellen Spironello V, CRNP Last Rate: 0 4 mcg/kg/min (07/02/20 0749)   atorvastatin 40 mg Oral Daily With [facility] VAN Costa    chlorhexidine 15 mL Swish & Spit Q12H [address] Amy Mckinney PA-C    LORazepam 2 mg Intravenous Q4H PRN Amy Mckinney PA-C    midodrine 15 mg Oral TID Maryellen Spironello V, CRNP    nicotine 7 mg Transdermal Daily Amy Mckinney PA-C    norepinephrine 1-30 mcg/min Intravenous Titrated Maryellen Spironello V, CRNP Last Rate: Stopped (07/02/20 1040)     Continuous Infusions:    argatroban 0 05-3 mcg/kg/min Last Rate: 0 4 mcg/kg/min (07/02/20 0749)   norepinephrine 1-30 mcg/min Last Rate: Stopped (07/02/20 1040)     PRN Meds:     LORazepam 2 mg Q4H PRN       Allergies   No Known Allergies  ---------------------------------------------------------------------------------------  Advance Directive and Living Will:    none  Power of :  jasson Marie  LOU:  none   ---------------------------------------------------------------------------------------  Care Time Delivered:   No Critical Care time spent     MIKE Olmos      Portions of the record may have been created with voice recognition software  Occasional wrong word or "sound a like" substitutions may have occurred due to the inherent limitations of voice recognition software    Read the chart carefully and recognize, using context, where substitutions have occurred

## 2020-07-02 NOTE — PROCEDURES
BRONCHOSCOPY NOTE   Yesi Baez 75 y o  female MRN: [id number]  Unit/Bed#: [facility] 516-01 Encounter: [id number]      PRE OPERATIVE DIAGNOSIS: Hypoxia     POST OPERATIVE DIAGNOSIS: Left Main Bronchus Mucous Plugging    LOCATION: [address] [place] [address]    Informed consent obtained  Images reviewed prior to the procedure  A Time Out was performed  ASA: 4    MALLAMPATI SCORE: unable to assess, endotracheal intubation    MONITORING:  Cardiac rhythm, pulse and pulse oximetry were monitored continuously during the procedure  Blood pressure was monitored every 3 minutes during the procedure  SEDATION: Fentanyl, Versed    PROCEDURE:    No suspicion or identified risk for TB or other airborne infectious disease; bronchoscopy procedure being performed for diagnostic purposes    Standard airway preparation completed per respiratory therapy protocol  After appropriate level of sedation was achieved, a standard bronchoscope was inserted thru the endotracheal tube and advanced to the level of the main hannah  The distal trachea was normal in appearance  The main hannah was normal in appearance  Ten milliliters of 1% Lidocaine without epniephrine was instilled at the level of the hannah to provide topical analgesia and suppress the coughing reflex  The airways of the left lung were first examined  A thick, white mucous plug was found in the left mainstem bronchus, with copious thinner secretions extending into the segmental airways distally  This was removed with suctioning  Saline irrigation was used to facilitate clearance of all of the mucous  Samples were collected for cell count, gram stain and culture  Airway survey was completed bilaterally to at least the second segmental level  The anatomy was normal   The mucosa was normal in appearance  There were no endobronchial masses, lesions, or obstructions noted  The bronchoscope was then retracted and the airways of the right lung were examined    Airway survey was completed bilaterally to at least the second segmental level  The anatomy was normal   The mucosa was normal in appearance  There were no endobronchial masses, lesions, or obstructions noted  Bronchoscope removed  The patient was kept in ICU for post-procedure recovery    COMPLICATIONS:  None  There were no unplanned events  ESTIMATED BLOOD LOSS: Minimal    FINDINGS:   1  Left mainstem bronchus mucous plugging  PLAN:  · Sedation recovery per protocol  · Await results of specimens obtained  · Post-bronchoscopy chest x-ray  · Follow-up cultures      Daniel Corea DO

## 2020-07-02 NOTE — PROGRESS NOTES
Critical care attending Progress Note - Critical Care  Martita Love y o  female MRN: [id number]  Unit/Bed#: [facility] 516-01 Encounter: [id number]      Vitals:    07/02/20 0500 07/02/20 0600 07/02/20 0746 07/02/20 0900   BP:       Pulse: 82 84  82   Resp: 12 (!) 9  (!) 9   Temp:       TempSrc:       SpO2: 92% 95% 97% 95%   Weight:         Respiratory    Lab Data (Last 4 hours)    None         O2/Vent Data (Last 4 hours)      07/02 0746           Vent Mode AC/VC       Resp Rate (BPM) (BPM) 16       Vt (mL) (mL) 300       FIO2 (%) (%) 40       PEEP (cmH2O) (cmH2O) 8       Patient safety screen outcome: Failed       MV 8 7                 GEN: NAD, awake and alert  HEENT: EOMI, PERRLA, MMM  CV: RRR,   Murmur  Resp:  CTA, no R/R/W  GI: soft,NT/ND  : urinary catheter  Neuro:   Skin: warm, dry    Cardiac arrest x3 with return of spontaneous circulation  Acute hypoxic respiratory failure on chronic respiratory failure  Hypotension  Left lung atelectasis s/p bronch 7/1/20  Acute encephalopathy likely anoxic brain injury  End-stage renal disease on hemodialysis  Aortic stenosis severe declined for TAVR [place]  Hypotension-resolved  Hepatitis-C  Transaminitis- due to hepatic hypoperfusion  Diabetes mellitus type 2 diet controlled  Thrombocytopenia  Anemia    NE 2-3mcg    Patient clotted off multiple CVVH filters, 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

## 2020-07-02 NOTE — NUTRITION
Tube feeding recommendations from 7/1/20:       07/01/20 1031   Recommendations/Interventions   Nutrition Recommendations Other (specify)  (if unable to extubate and advance diet consider TF  If medically appropriate, rec Nepro @10ml/hr advance as tolerated to goal rate 26ml/hr + 1 packet prosource daily to provide 624ml, 1183kcal, 66g pro, 456ml free water   Monitor weight and electrolytes)

## 2020-07-03 NOTE — PROGRESS NOTES
Daily Progress Note - Critical Care   Rosales Romero 75 y o  female MRN: [id number]  Unit/Bed#: [facility] 516-01 Encounter: [id number]        ----------------------------------------------------------------------------------------  HPI/24hr events: Overnight with episodes of tachypnea again with peak pressures  Attempted increased sedation with ativan, versed, fentanyl with no success  Eventually added propofol at 20mcg with improvement  Repeat CXR with no PTX  Levo increased from 5 to 10     ---------------------------------------------------------------------------------------  SUBJECTIVE    Review of Systems  Review of systems was unable to be performed secondary to AMS, intubated  ---------------------------------------------------------------------------------------  Assessment and Plan:    Neuro:   · Diagnosis: Acute encephalopathy   ? Likely secondary to anoxic brain injury in setting of cardiac arrest with prolonged downtime  ? Completed TTM  ? Continue routine neuro checks   ? cEEG d/c'd yesterday--  negative for seizure activity, continuous diffuse background suppression findings are consistent with severe diffuse cerebral dysfunction, specifically anoxic encephalopathy with post-anoxic myoclonus     CV:   · Diagnosis: PEA cardiac arrest, severe aortic stenosis, mitral stenosis, diastolic dysfunction   ? Unclear etiology of cardiac arrest-respiratory in origin vs metabolic derangements from missed dialysis  ? Echo 6/29: EF 55-60%  [address]  Mild to Mod MR  Mod to severe AS  ? Per chart review patient evaluated by CT surgery at [facility] and was refused as she was deemed too high risk for TAVR  · Diagnosis: Non MI troponin elevation  ? Demand ischemia secondary to cardiac arrest   ? EKG with non specific ST changes   ? Troponin peak 9 3  · Diagnosis:  Hypotension-acute on chronic   ? Continue midodrine 15mg TID  ?  Wean Levophed for MAP greater than 60     Pulm:  · Diagnosis:  Acute on chronic respiratory failure with hypoxia (wears 2L O2 chronically at home)   ? S/P bronch 07/01 secondary to acute mucus plugging of left mainstem bronchi, post CXR with improved aeration of left lung  ? Current vent settings AC VC 16/300/40%/5   ? SBT planned this morning, mental status precludes extubation  ? Chlorhexidine, ppi, head of bed > 30°     GI:   · Diagnosis: Hep C/transaminitis   ? No evidence of decompensated liver failure at this time  ? Monitor LFTs     :   · Diagnosis:  ESRD on dialysis  ? Plan: CVVH d/c'd yesterday  ? Bladder scan q shift  ? iHD per nephro     F/E/N:   · Plan: Continue TF-Jevity 1 2 @ goal 50, monitor electrolytes        Heme/Onc:   · Diagnosis: Thrombocytopenia  ? Acute drop in platelets 896--42--76--8--51  ? Now improving to 31  ? SQH D/C'd  ? hemolysis smear with + schistocytes and helmet cells  ? Rapid HIT and ELOY pending  ? Continue argatroban  · Diagnosis: Anemia  ? Unclear etiology, no signs of active bleeding-- likely some chronic component  ? 1u pRBC on 7/1  ? Hgb stable at 10 4 this AM        Endo:   · Diagnosis:Type II DM   ? Plan: A1c 5 2, diet controlled per daughter  POC glucose q6h, no coverage nneded     ID:   · No active isuses, trend temps and WBC  · Sputum cx with 4+ growth Klebsiella PNA  · Will d/w daughter escalation of care as family seem inclined to move towards comfort measures     MSK/Skin:   · Turn and reposition to off load pressure points    Disposition: Continue Critical Care -- continue [address] discussion  I did speak with daughter this AM regarding need for increased pressors  Daughter favors not escalating care by adding additional vasopressor medications as needed  Daughter to come in later today for family meeting      Code Status: Level 2 - DNAR: but accepts endotracheal intubation  ---------------------------------------------------------------------------------------  ICU CORE MEASURES    Prophylaxis   VTE Pharmacologic Prophylaxis: Argatroban  VTE Mechanical Prophylaxis: sequential compression device  Stress Ulcer Prophylaxis: Pantoprazole IV     ABCDE Protocol (if indicated)  Plan to perform spontaneous awakening trial today? Yes  Plan to perform spontaneous breathing trial today? Yes  Obvious barriers to extubation? Yes  CAM-ICU: Unable to assess    Invasive Devices Review  Invasive Devices     Arterial Line            Arterial Line 20 Left Femoral 3 days          Line            Hemodialysis AV Fistula 20 Left Upper arm 3 days    Hemodialysis AV Fistula 20 Right Upper arm 3 days          Hemodialysis Catheter            HD Temporary Double Catheter 4 days    Hemodialysis Catheter Femoral 4 days          Drain            NG/OG Tube Orogastric Left mouth 3 days          Airway            ETT  Cuffed 7 mm 4 days              Can any invasive devices be discontinued today? No  ---------------------------------------------------------------------------------------  OBJECTIVE    Vitals   Vitals:    20 0900 20 1000 20 1100 20 1200   BP:       Pulse: 90 82 78 78   Resp: (!) 41 (!) 35 (!) 31 (!) 39   Temp: 99 3 °F (37 4 °C) 98 6 °F (37 °C) 97 9 °F (36 6 °C) 97 5 °F (36 4 °C)   TempSrc:       SpO2: 96% 96% 95%    Weight:         Temp (24hrs), Av 7 °F (37 6 °C), Min:97 2 °F (36 2 °C), Max:101 5 °F (38 6 °C)  Current: Temperature: 97 5 °F (36 4 °C)    Respiratory:  SpO2: SpO2: 95 %, SpO2 Activity: SpO2 Activity: At Rest, SpO2 Device: O2 Device: Ventilator       Invasive/non-invasive ventilation settings   Respiratory    Lab Data (Last 4 hours)    None         O2/Vent Data (Last 4 hours)       1117           Vent Mode AC/VC       Resp Rate (BPM) (BPM) 16       Vt (mL) (mL) 280       FIO2 (%) (%) 40       PEEP (cmH2O) (cmH2O) 5       MV 9 5                   Physical Exam   Constitutional: No distress  HENT:   Head: Normocephalic and atraumatic  Eyes:   Pupil 3mm, unreactive  Neck: Neck supple     Cardiovascular: Normal rate, regular rhythm and normal heart sounds  Exam reveals no friction rub  No murmur heard  Pulmonary/Chest: Breath sounds normal  She has no wheezes  She has no rales  Intubated and mechanically ventilated  Overbreathing vent  Tachypneic   Abdominal: Soft  There is no tenderness  There is no guarding  Musculoskeletal: She exhibits no edema  Neurological: She is unresponsive  Skin: She is not diaphoretic  No erythema  No pallor  Does not withdrawal to pain, no posturing  Corneals absent  No cough or gag  Laboratory and Diagnostics:  Results from last 7 days   Lab Units 07/03/20  0424 07/02/20  1545 07/02/20  1542 07/02/20  0526 07/01/20  2335 07/01/20  1409 07/01/20  0537 06/30/20  0559  06/29/20  1350  06/29/20  0751   WBC Thousand/uL 5 48  --  8 86 5 90  --  5 53 8 00 5 41  --   --   --  7 03   HEMOGLOBIN g/dL 10 4*  --  9 1* 9 0* 9 1* 7 5* 8 4* 9 2*   < >  --   --  10 3*   I STAT HEMOGLOBIN g/dl  --  9 5*  --   --   --   --   --   --    < >  --    < >  --    HEMATOCRIT % 33 2*  --  29 1* 28 3*  --  24 3* 27 2* 28 4*   < >  --   --  34 8   HEMATOCRIT, ISTAT %  --  28*  --   --   --   --   --   --    < >  --    < >  --    PLATELETS Thousands/uL 31*  --  26* 14*  --  10* 32* 60*  --  208  --  146*   MONO PCT %  --   --   --   --   --   --   --   --   --   --   --  4    < > = values in this interval not displayed       Results from last 7 days   Lab Units 07/03/20  0424 07/02/20  1545 07/02/20  1542 07/02/20  0526 07/01/20  2335 07/01/20  1754 07/01/20  1137 07/01/20  0537  06/30/20  0559  06/29/20  0751   SODIUM mmol/L 137  --  135* 136 135* 138 139 137   < > 147*   < > 157*   POTASSIUM mmol/L 5 2  --  4 9 4 6 5 0 4 7 5 3 4 9   < > 3 8   < > 4 4   CHLORIDE mmol/L 108  --  106 106 106 107 109* 105   < > 108   < > 112*   CO2 mmol/L 18*  --  21 23 24 23 23 23   < > 28   < > 32   CO2, I-STAT mmol/L  --  23  --   --   --   --   --   --   --   --    < >  --    ANION GAP mmol/L 11  --  8 7 5 8 7 9   < > 11   < > 13   BUN mg/dL 34*  --  27* 22 27* 27* 32* 33*   < > 66*   < > 90*   CREATININE mg/dL 3 08*  --  2 54* 1 98* 2 27* 2 31* 2 88* 3 07*   < > 6 64*   < > 10 41*   CALCIUM mg/dL 8 9  --  8 4 8 9 8 9 9 0 9 2 9 6   < > 10 2*   < > 14 3*   GLUCOSE RANDOM mg/dL 131  --  248* 125 114 151* 138 119   < > 82   < > 318*   ALT U/L  --   --   --  42  --   --   --  56  --  75  --  52   AST U/L  --   --   --  105*  --   --   --  91*  --  114*  --  57*   ALK PHOS U/L  --   --   --  198*  --   --   --  198*  --  199*  --  197*   ALBUMIN g/dL  --   --   --  2 6*  --   --   --  2 8*  --  2 9*  --  2 9*   TOTAL BILIRUBIN mg/dL  --   --   --  0 84  --   --   --  0 58  --  0 81  --  0 30    < > = values in this interval not displayed       Results from last 7 days   Lab Units 07/03/20  0424 07/02/20  0526 07/01/20 2335 07/01/20  1754 07/01/20  1137 07/01/20  0537 06/30/20  2354   MAGNESIUM mg/dL 2 0 2 1 2 0 2 0 2 0 2 1 2 1   PHOSPHORUS mg/dL 2 9 2 8 3 6 3 7 4 3* 4 1 3 9      Results from last 7 days   Lab Units 07/03/20  0635 07/02/20 2001 07/02/20  0826 07/02/20  0413 07/01/20  2335 07/01/20  1754 07/01/20  1414 07/01/20  1137   INR   --   --   --   --   --   --   --  1 44*   PTT seconds 62* 62* 68* 66* 49* 48* 42*  --       Results from last 7 days   Lab Units 06/30/20  0139 06/29/20 2233 06/29/20  1745 06/29/20  1350 06/29/20  0751   TROPONIN I ng/mL 8 63* 9 31* 5 79* 3 02* 0 03     Results from last 7 days   Lab Units 06/29/20  1350 06/29/20  0946 06/29/20  0751   LACTIC ACID mmol/L 1 6 5 9* 12 4*     ABG:  Results from last 7 days   Lab Units 07/02/20  1305   PH ART  7 438   PCO2 ART mm Hg 30 4*   PO2 ART mm Hg 85 7   HCO3 ART mmol/L 20 1*   BASE EXC ART mmol/L -3 4   ABG SOURCE  Line, Arterial     VBG:  Results from last 7 days   Lab Units 07/02/20  1305   ABG SOURCE  Line, Arterial           Micro  Results from last 7 days   Lab Units 07/01/20  0956 06/29/20  1119   SPUTUM CULTURE  4+ Growth of Klebsiella pneumoniae*  2+ Growth of   --    GRAM STAIN RESULT  2+ Polys*  2+ Gram positive cocci in pairs*  1+ Gram negative rods*  --    MRSA CULTURE ONLY   --  No Methicillin Resistant Staphlyococcus aureus (MRSA) isolated         Intake and Output  I/O       07/01 0701 - 07/02 0700 07/02 0701 - 07/03 0700 07/03 0701 - 07/04 0700    P  O   0 0    I V  (mL/kg) 351 4 (8 6) 365 4 (8 5) 151 2 (3 5)    Blood 350      NG/ 280 100    IV Piggyback 277 5      Feedings 8 1037     Total Intake(mL/kg) 1112 9 (27 1) 1682 4 (38 9) 251 2 (5 8)    Urine (mL/kg/hr) 0 (0) 0 (0)     Emesis/NG output 110  500    Other 1895 142     Total Output 2005 142 500    Net -892 1 +1540 4 -248 8               Height and Weights      IBW: -92 5 kg  Body mass index is 20 61 kg/m²  Weight (last 2 days)     Date/Time   Weight    07/03/20 0600   43 2 (95 24)    07/01/20 0600   41 1 (90 61)                Nutrition       Diet Orders   (From admission, onward)             Start     Ordered    07/01/20 1916  Diet Enteral/Parenteral; Tube Feeding No Oral Diet; Jevity 1 2 Marcin; Continuous; 50  Diet effective now     Question Answer Comment   Diet Type Enteral/Parenteral    Enteral/Parenteral Tube Feeding No Oral Diet    Tube Feeding Formula: Jevity 1 2 Marcin    Bolus/Cyclic/Continuous Continuous    Tube Feeding Goal Rate (mL/hr): 50    RD to adjust diet per protocol?  Yes        07/01/20 1916                Active Medications  Scheduled Meds:  Current Facility-Administered Medications:  acetaminophen 975 mg Oral Q6H Morris Donaldson PA-C    alteplase 2 mg Intracatheter Once MIKE Giraldo    alteplase 2 mg Intracatheter Once MIKE Giraldo    argatroban 0 05-3 mcg/kg/min Intravenous Titrated MIKE Mars Last Rate: 0 4 mcg/kg/min (07/02/20 1419)   atorvastatin 40 mg Oral Daily With Plantersvillesisi Padron PA-C    calcium gluconate 1 g Intravenous Once Ronan Conrad PA-C Last Rate: 1 g (07/03/20 1233)   chlorhexidine 15 mL Swish & Spit Q12H [address] Sammi Body VAN Padron    LORazepam 2 mg Intravenous Q4H PRN Maryellen Spironello V, CRNP    midodrine 15 mg Oral TID Maryellen Spironello V, CRNP    nicotine 7 mg Transdermal Daily Luis Segura PA-C    norepinephrine 1-30 mcg/min Intravenous Titrated Maryellen Spironello V, CRNP Last Rate: 30 mcg/min (07/03/20 1201)   propofol 5-50 mcg/kg/min Intravenous Titrated Bridget Shirley MD Last Rate: Stopped (07/03/20 1130)     Continuous Infusions:    argatroban 0 05-3 mcg/kg/min Last Rate: 0 4 mcg/kg/min (07/02/20 1419)   norepinephrine 1-30 mcg/min Last Rate: 30 mcg/min (07/03/20 1201)   propofol 5-50 mcg/kg/min Last Rate: Stopped (07/03/20 1130)     PRN Meds:     LORazepam 2 mg Q4H PRN       Allergies   No Known Allergies  ---------------------------------------------------------------------------------------  Advance Directive and Living Will:      Power of :    POLST:    ---------------------------------------------------------------------------------------  Care Time Delivered:   Upon my evaluation, this patient had a high probability of imminent or life-threatening deterioration due to Severe anoxic brain injury, AHRF, ESRD, which required my direct attention, intervention, and personal management  I have personally provided 27 minutes (12:40 to 13:07) of critical care time, exclusive of procedures, teaching, family meetings, and any prior time recorded by providers other than myself  Arty Kidney, PA-C      Portions of the record may have been created with voice recognition software  Occasional wrong word or "sound a like" substitutions may have occurred due to the inherent limitations of voice recognition software    Read the chart carefully and recognize, using context, where substitutions have occurred

## 2020-07-03 NOTE — RESPIRATORY THERAPY NOTE
RT Ventilator Management Note  Kelton Garcia 75 y o  female MRN: [id number]  Unit/Bed#: [facility] 375-42 Encounter: [id number]      Daily Screen       7/2/2020  0746 7/3/2020  0739          Patient safety screen outcome[de-identified]  Failed  Failed      Not Ready for Weaning due to[de-identified]  Underline problem not resolved  Underline problem not resolved; Hemodynamically unstable              Physical Exam:   Assessment Type: (P) Assess only  General Appearance: (P) Sedated  Respiratory Pattern: (P) Assisted  Chest Assessment: (P) Chest expansion symmetrical  Bilateral Breath Sounds: (P) Coarse  O2 Device: (P) vent      Resp Comments: (P) pt tachypenic, physician aware, will continue to monitir

## 2020-07-03 NOTE — PROGRESS NOTES
PT vomited brown red tinged emesis  Raised head of bed, suctioned  Placed OG to low continuous suction  500ml of TF drained  Made CC medicine aware  OK to hold TFs, no need to replace residual back

## 2020-07-03 NOTE — RESPIRATORY THERAPY NOTE
RT Ventilator Management Note  Luan Art 75 y o  female MRN: [id number]  Unit/Bed#: [facility] 938-13 Encounter: [id number]      Daily Screen       7/2/2020  0746 7/3/2020  0739          Patient safety screen outcome[de-identified]  Failed  Failed      Not Ready for Weaning due to[de-identified]  Underline problem not resolved  Underline problem not resolved; Hemodynamically unstable              Physical Exam:   Assessment Type: Assess only  General Appearance: Sedated  Respiratory Pattern: Assisted  Chest Assessment: Chest expansion symmetrical  Bilateral Breath Sounds: Coarse  O2 Device: vent      Resp Comments: (P) pt terminally extubated to room air, Dr Emily Betancourt present in room, +cuff leak noted, pt placed on room air

## 2020-07-03 NOTE — DEATH NOTE
INPATIENT DEATH NOTE  Brittney Betancourt 75 y o  female MRN: [id number]  Unit/Bed#: [facility] 404-51 Encounter: [id number]    Date, Time and Cause of Death    Date of Death:  7/3/20  Time of Death:   2:45 PM  Preliminary Cause of Death:  Cardiac arrest with pulseless electrical activity ([facility] [address] )  Entered by:  Isaiah Donaldson PA-C[EC1 1]     Attribution     EC1 1 Ansley Flores, [place] 07/03/20 15:00               PHYSICAL EXAM:    Called to bedside by RN stating patient becoming increasingly bradycardic with HR in 35s and MAPs in 35s  Per previous conversation with daughter, no escalation of care was to take place including addition of vasopressors  With patient actively dying, decision made to compassionately extubate and provide prn pain/anxiety medication  On exam, patient is identified visually and identification confirmed with identification bracelet  All lines intact  Patient unresponsive to noxious stimuli  No spontaneous respirations  No breath sounds bilaterally  No palpable pulse or audible heart sounds  Pupils fixed bilaterally  Corneal blink reflex absent  Asystolic on two contiguous leads  Time of death: 14:45    Family notified by phone  Attending notified  [place] office called by RN  Medical Examiner notification criteria:  NONE APPLICABLE   Medical Examiner's office notified?:  Yes   Medical Examiner accepted case?:  No         Autopsy Options:  Decision for post-mortem examination not yet made by next of kin  Primary Service Attending Physician notified?:  yes - Attending:  Rosana Veras MD  Physician/Resident responsible for completing Discharge Summary:  Isaiah Donaldson PA-C

## 2020-07-03 NOTE — PROGRESS NOTES
Noticed OG was coiled in mouth  Pulled OG and re attempted to insert  Unsuccessful  Spoke with Priscilla Fonseca to attempt NG  NG was also unsuccessful  Made Mario Jean Baptiste from [address] [place] [address] aware

## 2020-07-03 NOTE — PROGRESS NOTES
Maps noted to be 44 on Erika  Bilateral limb alert on upper extremities  Unable to get a reading with a thigh pressure  Titrating levophed up currently at 12 mcgs  Milady Urbano aware  Will be up to assess the patient

## 2020-07-03 NOTE — PROGRESS NOTES
NEPHROLOGY PROGRESS NOTE   Rosaline Harper 75 y o  female MRN: [id number]  Unit/Bed#: [facility] 516-01 Encounter: [id number]  Reason for Consult:  End-stage renal disease    ASSESSMENT/PLAN:  1  End-stage renal disease currently on CVVH, previously on maintenance hemodialysis although unknown schedule  2  Mild metabolic acidosis  3  Encephalopathy, possible anoxic injury  4  Post cardiac arrest   5  Shock/hypotension, wean hemodynamic support as tolerated, continue with oral midodrine  6  History of severe aortic stenosis  7  Mineral bone disorder secondary CKD, continued CVVH  8  Anemia of CKD currently not on LIZANDRO treatment    PLAN:  · Plan for intermittent hemodialysis today  · No significant ultrafiltration given patient's blood pressure  · Possible withdrawal next 24 hours       Addendum, time 1:45 p m , chart reviewed  No escalation care including additional pressors  Will defer on hemodialysis as this will likely potentiate hemodynamic insult/instability  Discussed with Critical Care  Likely withdrawal care the next 24 hours    SUBJECTIVE:  Seen examined  Patient unresponsive  Remains on hemodynamic support  Remains intubated  Review of Systems    OBJECTIVE:  Current Weight: Weight - Scale: 43 2 kg (95 lb 3 8 oz)  Vitals:    07/03/20 0739 07/03/20 0800 07/03/20 0900 07/03/20 1000   BP:       Pulse:  92 90 82   Resp:  (!) 37 (!) 41 (!) 35   Temp:  (!) 101 1 °F (38 4 °C) 99 3 °F (37 4 °C) 98 6 °F (37 °C)   TempSrc:       SpO2: 94% 95% 96% 96%   Weight:           Intake/Output Summary (Last 24 hours) at 7/3/2020 1100  Last data filed at 7/3/2020 0800  Gross per 24 hour   Intake 1404 12 ml   Output 0 ml   Net 1404 12 ml       Physical Exam   Constitutional: She is intubated  Cardiovascular: Normal rate and regular rhythm  Pulmonary/Chest: She is intubated  She has decreased breath sounds in the right lower field and the left lower field  Abdominal: Soft  Musculoskeletal: She exhibits edema  Neurological: She is unresponsive  Skin: Skin is warm and dry  No rash noted         Medications:    Current Facility-Administered Medications:     acetaminophen (TYLENOL) oral suspension 975 mg, 975 mg, Oral, Q6H, Rachel Donaldson PA-C, 975 mg at 07/03/20 0413    alteplase (CATHFLO) injection 2 mg, 2 mg, Intracatheter, Once, Laura CHIRAG Song, CRNP    alteplase (CATHFLO) injection 2 mg, 2 mg, Intracatheter, Once, Laura CHIRAG Song, CRNP    argatroban infusion (premix), 0 05-3 mcg/kg/min, Intravenous, Titrated, Maryellen Spironello V, CRNP, Last Rate: 1 mL/hr at 07/02/20 1419, 0 4 mcg/kg/min at 07/02/20 1419    atorvastatin (LIPITOR) tablet 40 mg, 40 mg, Oral, Daily With Dinner, [facility] VAN Griffin, 40 mg at 07/02/20 1619    chlorhexidine (PERIDEX) 0 12 % oral rinse 15 mL, 15 mL, Swish & Spit, Q12H [address], Hailey Padron PA-C, 15 mL at 07/03/20 5386    LORazepam (ATIVAN) injection 2 mg, 2 mg, Intravenous, Q4H PRN, Maryellen Mccannnello V, CRNP, 2 mg at 07/02/20 2012    midodrine (PROAMATINE) tablet 15 mg, 15 mg, Oral, TID, Maryellen Mccannnello V, CRNP, 15 mg at 07/03/20 0837    nicotine (NICODERM CQ) 7 mg/24hr TD 24 hr patch 7 mg, 7 mg, Transdermal, Daily, Hailey Padron PA-C, 7 mg at 07/01/20 0801    norepinephrine (LEVOPHED) 8 mg (DOUBLE CONCENTRATION) IV in sodium chloride 0 9% 250 mL, 1-30 mcg/min, Intravenous, Titrated, Maryellen Spironello V, CRNP, Last Rate: 26 3 mL/hr at 07/03/20 1059, 14 mcg/min at 07/03/20 1059    propofol (DIPRIVAN) 1000 mg in 100 mL infusion (premix), 5-50 mcg/kg/min, Intravenous, Titrated, Tamica Meyers MD, Last Rate: 7 4 mL/hr at 07/03/20 1033, 30 mcg/kg/min at 07/03/20 1033    Laboratory Results:  Results from last 7 days   Lab Units 07/03/20  0424 07/02/20  1545 07/02/20  1542 07/02/20  0526 07/01/20  2335 07/01/20  1754 07/01/20  1409 07/01/20  1137 07/01/20  0537 06/30/20  2354  06/30/20  0559  06/29/20  1505  06/29/20  1350 06/29/20  1143  06/29/20  0751   WBC Thousand/uL 5 48  --  8 86 5 90  --   --  5 53  --  8 00  --   --  5 41  --   --   --   --   --   --  7 03   HEMOGLOBIN g/dL 10 4*  --  9 1* 9 0* 9 1*  --  7 5*  --  8 4*  --   --  9 2*   < >  --   --   --   --   --  10 3*   I STAT HEMOGLOBIN g/dl  --  9 5*  --   --   --   --   --   --   --   --   --   --   --  10 9*  --   --  11 9   < >  --    HEMATOCRIT % 33 2*  --  29 1* 28 3*  --   --  24 3*  --  27 2*  --   --  28 4*   < >  --   --   --   --   --  34 8   HEMATOCRIT, ISTAT %  --  28*  --   --   --   --   --   --   --   --   --   --   --  32*  --   --  35   < >  --    PLATELETS Thousands/uL 31*  --  26* 14*  --   --  10*  --  32*  --   --  60*  --   --   --  208  --   --  146*   POTASSIUM mmol/L 5 2  --  4 9 4 6 5 0 4 7  --  5 3 4 9 4 9   < > 3 8   < >  --    < >  --   --   --  4 4   CHLORIDE mmol/L 108  --  106 106 106 107  --  109* 105 108   < > 108   < >  --    < >  --   --   --  112*   CO2 mmol/L 18*  --  21 23 24 23  --  23 23 24   < > 28   < >  --    < >  --   --   --  32   CO2, I-STAT mmol/L  --  23  --   --   --   --   --   --   --   --   --   --   --  35*  --   --  27   < >  --    BUN mg/dL 34*  --  27* 22 27* 27*  --  32* 33* 38*   < > 66*   < >  --    < >  --   --   --  90*   CREATININE mg/dL 3 08*  --  2 54* 1 98* 2 27* 2 31*  --  2 88* 3 07* 3 55*   < > 6 64*   < >  --    < >  --   --   --  10 41*   CALCIUM mg/dL 8 9  --  8 4 8 9 8 9 9 0  --  9 2 9 6 9 6   < > 10 2*   < >  --    < >  --   --   --  14 3*   MAGNESIUM mg/dL 2 0  --   --  2 1 2 0 2 0  --  2 0 2 1 2 1   < > 2 1   < >  --    < >  --   --   --  2 9*   PHOSPHORUS mg/dL 2 9  --   --  2 8 3 6 3 7  --  4 3* 4 1 3 9   < > 3 7   < >  --    < >  --   --   --  8 9*   GLUCOSE, ISTAT mg/dl  --  244*  --   --   --   --   --   --   --   --   --   --   --  >600*  --   --  360*  --   --     < > = values in this interval not displayed

## 2020-07-03 NOTE — DISCHARGE SUMMARY
Discharge Summary - Rosaline Harper 75 y o  female MRN: [id number]    [facility] En[facility]: [id number] PCP: No primary care provider on file  Admission Date:   Admission Orders (From admission, onward)     Ordered        06/29/20 2213  Inpatient Admission  Once                     Admitting Diagnosis: Cardiac arrest [facility]) [I46 9]    HPI: Per VAN Padron, "Rosaline Harper is a 75 y o  female who presents to [facility] as an out of hospital cardiac arrest   Patient had told her daughter Destiny Oseguera can not breathe and became unresponsive  CPR was initiated by EMS upon their arrival   Presenting rhythm was pea and ultimately ROSC was achieved after 45 minutes of ACLS  She did sustain another PE a arrest at [facility] later that afternoon and ROSC was regained after 2 rounds of CPR  Etiology of the cardiac arrest is unclear however patient last dialysis session was on 06/24 so possible electrolyte versus metabolic mediated  Patient had been getting dialysis in [address] [place] [address], but recently had moved to [place] and had not established care  Post cardiac arrest patient was admitted to the ICU and required norepinephrine for blood pressure support, a right femoral hemodialysis catheter was placed and CRRT initiated, her filter clotted twice  There were attempts to obtain central access from both internal jugular veins which were unsuccessful due to inability to pass wire past 20 cm  As the day continued she was noted to have intermittent jerking activity with gaze preference, questionable seizure-like activity versus myoclonus and she was transferred to [facility] for continuous video EEG monitoring "    Procedures Performed: No orders of the defined types were placed in this encounter  Summary of Hospital Course: Once 6/30, patient remains encephalopathic  EEG consistent with burst suppression pattern  Did not show any seizure activity    Patient completed targeted temperature management was removed warmed  She was maintained on ventilator and CVVH  Which was initially run negative  On 07/01, patient had left lung atelectasis with white on chest x-ray  Underwent bronchoscopy with suction mucus plug at left mainstem bronchus  Patient with continued poor neurologic prognosis  No seizure on EEG, poor activity on EEG  Family discussions ongoing  Levo continue to increase for goal greater than 60  CVVH continue right -50  Platelets decreased to count of 9  Serotonin release assay sent to rule out hit  Argatroban initiate  Transfuse 1 unit PRBCs  On 07/02, patient clotted off multiple CVA shelters  Remained on levo  Neurologically unchanged and video EEG discontinued  Midodrine increase in attempt to wean pressors  Ongoing goals of care/with family  On 07/03, patient had episodes of tachypnea the previous night requiring initiation of fall  Levo increased from 5-10  Throughout the day on 07/03, patient became increasingly hypotensive despite maxing out on Levophed  Discussion held with patient's daughter who agreed no escalation of care including additional vasopressors should occur  Daughter made aware patient may not survive given her continued hypotension  At around 14:15, I was called by patient's nurse stating that maps were persistently in the 40s in the 30s and that she had now become bradycardic to the 30s from previous rate in the 70s  Patient eventually developed PEA and agonal respirations and comfort measures were instituted  Patient was compassionately extubated and given pain anxiety medication and passed briefly after  Family was contacted by myself to inform the patient's death  Significant Findings, Care, Treatment and Services Provided:     Ct Chest Abdomen Pelvis Wo Contrast    Result Date: 6/29/2020  Impression: Bilateral consolidations without groundglass opacity   In the setting of clinically suspected/proven COVID-19, the above lung parenchymal findings on CT indicate low confidence level for COVID-19  Endotracheal tube at the origin of the right mainstem bronchus  Severe T6 compression fracture resulting in an 80% kyphosis  The age is indeterminate but is not described on a recent chest x-ray report from [facility]  AND [facility]  Questionable posterior displaced midsternal fracture corresponding to motion artifact  Diffuse osteopenia of the spine with sclerosis of the endplates suggesting renal osteodystrophy  Bilateral renal atrophy  Tiny amount of free fluid adjacent to the tip of the liver Incidental 24 x 20 mm calcified left thyroid nodule identified on this CT  According to guidelines published in the February 2015 white paper on incidental thyroid nodules in the Journal of the [employer] of Radiology [facility]), further evaluation with ultrasound may be considered  However, as the majority of incidental thyroid nodules are benign and because small incidental thyroid malignancies typically demonstrate indolent behavior, consideration of the patient's life expectancy and possible comorbidities should be taken into account prior to a decision to pursue further imaging  The nodule is described in a CT chest report from [facility]  AND [facility] dated February 19, 2017  Unfortunately, measurements are not included  Comparison with this study would help determine stability  The study was marked in [facility] for immediate notification  Workstation performed: [id number]     Xr Chest Portable    Result Date: 7/1/2020  Impression: Consolidation and partial collapse of the left lung, developing since 6/29/2020  The study was marked in [facility] for immediate notification  Workstation performed: [id number]     Xr Chest Portable    Result Date: 6/30/2020  Impression: Development of right perihilar dense consolidation concerning for pneumonia  Lines and tubes stable   Workstation performed: [id number]     Xr Chest Portable    Result Date: 2020  Impression: Satisfactory position of the endotracheal tube  CHF  Workstation performed: [id number]     Ct Head Without Contrast    Result Date: 2020  Impression: Apparent lucency within the right occipital lobe  Streak artifact from scalp metallic density limits evaluation of this region and it is unclear whether findings are artifactual in nature versus secondary to ischemia  Short-term interval follow-up and/or correlation with MRI is recommended for further evaluation  Chronic infarcts are noted within the left frontal lobe as well as right gangliocapsular region  Scattered chronic microvascular ischemic change  I personally discussed this study with Shin Wakefield on 2020 at 9:24 AM  Workstation performed: [id number]     Ct Cervical Spine Without Contrast    Result Date: 2020  Impression: No acute fracture or evidence for traumatic malalignment  Diffusely heterogeneous appearance of the bones is most compatible with renal osteodystrophy  Multilevel chronic appearing loss of height of cervical vertebral bodies is noted most notable at C6  Workstation performed: [id number]     Xr Chest Portable Icu    Result Date: 2020  Impression: Previous left lung base changes appear improved  Possible congestion  No pneumothorax  Workstation performed: [id number]     Xr Chest Portable Icu    Result Date: 2020  Impression: Re-aeration of the left lung following bronchoscopy with residual atelectasis or consolidation in the base of the left lower lobe  Workstation performed: [id number]       Disposition:      Final Diagnosis: Cardiac arrest with PEA causing anoxic brain injury    Resolved Problems  Date Reviewed: 2020    None          Condition at Time of Death: Critical    Date, Time and Cause of Death    Date of Death:  7/3/20  Time of Death:   2:45 PM  Preliminary Cause of Death:  Cardiac arrest with pulseless electrical activity ([facility] [address] )  Entered by:  Ailin Donaldson VAN[EC1 1]     Attribution     EC1 1 Larry Sutton PA-C 07/03/20 15:00          Death Note:    INPATIENT DEATH NOTE  Tu Ac 75 y o  female MRN: [id number]  Unit/Bed#: [facility] 261-04 Encounter: [id number]    Date, Time and Cause of Death    Date of Death:  7/3/20  Time of Death:   2:45 PM  Preliminary Cause of Death:  Cardiac arrest with pulseless electrical activity ([facility] [address] )  Entered by:  Emilie Donaldson PA-C[EC1 1]     Attribution     EC1 1 Larry R[place], [place] 07/03/20 15:00               PHYSICAL EXAM:    Called to bedside by RN stating patient becoming increasingly bradycardic with HR in 35s and MAPs in 35s  Per previous conversation with daughter, no escalation of care was to take place including addition of vasopressors  With patient actively dying, decision made to compassionately extubate and provide prn pain/anxiety medication  On exam, patient is identified visually and identification confirmed with identification bracelet  All lines intact  Patient unresponsive to noxious stimuli  No spontaneous respirations  No breath sounds bilaterally  No palpable pulse or audible heart sounds  Pupils fixed bilaterally  Corneal blink reflex absent  Asystolic on two contiguous leads  Time of death: 14:45    Family notified by phone  Attending notified  [place] office called by RN  Medical Examiner notification criteria:  NONE APPLICABLE   Medical Examiner's office notified?:  Yes   Medical Examiner accepted case?:  No         Autopsy Options:  Decision for post-mortem examination not yet made by next of kin  Primary Service Attending Physician notified?:  yes - Attending:  Hua Shepard MD  Physician/Resident responsible for completing Discharge Summary:  Emilie Donaldson PA-C

## 2020-07-04 LAB — PF4 HEPARIN CMPLX AB SER-ACNC: 0.15 OD (ref 0–0.4)

## 2020-07-05 LAB
ATRIAL RATE: 102 BPM
P AXIS: 81 DEGREES
PR INTERVAL: 120 MS
QRS AXIS: 64 DEGREES
QRSD INTERVAL: 98 MS
QT INTERVAL: 338 MS
QTC INTERVAL: 440 MS
T WAVE AXIS: 74 DEGREES
VENTRICULAR RATE: 102 BPM

## 2020-07-07 LAB
SRA .2 IU/ML UFH SER-ACNC: <1 % (ref 0–20)
SRA 100IU/ML UFH SER-ACNC: <1 % (ref 0–20)
SRA UFH SER-IMP: NORMAL
